# Patient Record
Sex: MALE | Race: OTHER | Employment: OTHER | URBAN - METROPOLITAN AREA
[De-identification: names, ages, dates, MRNs, and addresses within clinical notes are randomized per-mention and may not be internally consistent; named-entity substitution may affect disease eponyms.]

---

## 2017-05-06 ENCOUNTER — HOSPITAL ENCOUNTER (OUTPATIENT)
Age: 77
Discharge: HOME | End: 2017-05-06
Payer: MEDICARE

## 2017-05-06 DIAGNOSIS — N18.1 CKD (CHRONIC KIDNEY DISEASE) STAGE 1, GFR 90 ML/MIN OR GREATER: ICD-10-CM

## 2017-05-06 DIAGNOSIS — R80.9 MICROALBUMINURIA: ICD-10-CM

## 2017-05-06 DIAGNOSIS — N18.1 BENIGN HYPERTENSION WITH CKD (CHRONIC KIDNEY DISEASE) STAGE I: ICD-10-CM

## 2017-05-06 DIAGNOSIS — I12.9 BENIGN HYPERTENSION WITH CKD (CHRONIC KIDNEY DISEASE) STAGE I: ICD-10-CM

## 2017-05-06 LAB
ABSOLUTE EOS #: 0.2 K/UL (ref 0–0.4)
ABSOLUTE LYMPH #: 1.9 K/UL (ref 1–4.8)
ABSOLUTE MONO #: 0.5 K/UL (ref 0.1–1.3)
ANION GAP SERPL CALCULATED.3IONS-SCNC: 12 MMOL/L (ref 9–17)
BASOPHILS # BLD: 1 %
BASOPHILS ABSOLUTE: 0.1 K/UL (ref 0–0.2)
BUN BLDV-MCNC: 14 MG/DL (ref 8–23)
BUN/CREAT BLD: ABNORMAL (ref 9–20)
CALCIUM SERPL-MCNC: 9.6 MG/DL (ref 8.6–10.4)
CHLORIDE BLD-SCNC: 97 MMOL/L (ref 98–107)
CO2: 30 MMOL/L (ref 20–31)
CREAT SERPL-MCNC: 0.74 MG/DL (ref 0.7–1.2)
CREATININE URINE: 187.4 MG/DL (ref 39–259)
DIFFERENTIAL TYPE: NORMAL
EOSINOPHILS RELATIVE PERCENT: 4 %
GFR AFRICAN AMERICAN: >60 ML/MIN
GFR NON-AFRICAN AMERICAN: >60 ML/MIN
GFR SERPL CREATININE-BSD FRML MDRD: ABNORMAL ML/MIN/{1.73_M2}
GFR SERPL CREATININE-BSD FRML MDRD: ABNORMAL ML/MIN/{1.73_M2}
GLUCOSE BLD-MCNC: 126 MG/DL (ref 70–99)
HCT VFR BLD CALC: 47.5 % (ref 41–53)
HEMOGLOBIN: 16 G/DL (ref 13.5–17.5)
LYMPHOCYTES # BLD: 33 %
MAGNESIUM: 1.6 MG/DL (ref 1.6–2.6)
MCH RBC QN AUTO: 28.9 PG (ref 26–34)
MCHC RBC AUTO-ENTMCNC: 33.6 G/DL (ref 31–37)
MCV RBC AUTO: 86 FL (ref 80–100)
MICROALBUMIN/CREAT 24H UR: 27 MG/L
MICROALBUMIN/CREAT UR-RTO: 14 MCG/MG CREAT
MONOCYTES # BLD: 8 %
PDW BLD-RTO: 14 % (ref 11.5–14.9)
PHOSPHORUS: 3.2 MG/DL (ref 2.5–4.5)
PLATELET # BLD: 181 K/UL (ref 150–450)
PLATELET ESTIMATE: NORMAL
PMV BLD AUTO: 8.5 FL (ref 6–12)
POTASSIUM SERPL-SCNC: 4 MMOL/L (ref 3.7–5.3)
RBC # BLD: 5.53 M/UL (ref 4.5–5.9)
RBC # BLD: NORMAL 10*6/UL
SEG NEUTROPHILS: 54 %
SEGMENTED NEUTROPHILS ABSOLUTE COUNT: 3 K/UL (ref 1.3–9.1)
SODIUM BLD-SCNC: 139 MMOL/L (ref 135–144)
WBC # BLD: 5.6 K/UL (ref 3.5–11)
WBC # BLD: NORMAL 10*3/UL

## 2017-05-06 PROCEDURE — 80048 BASIC METABOLIC PNL TOTAL CA: CPT

## 2017-05-06 PROCEDURE — 83735 ASSAY OF MAGNESIUM: CPT

## 2017-05-06 PROCEDURE — 82570 ASSAY OF URINE CREATININE: CPT

## 2017-05-06 PROCEDURE — 85025 COMPLETE CBC W/AUTO DIFF WBC: CPT

## 2017-05-06 PROCEDURE — 82043 UR ALBUMIN QUANTITATIVE: CPT

## 2017-05-06 PROCEDURE — 84100 ASSAY OF PHOSPHORUS: CPT

## 2017-05-06 PROCEDURE — 36415 COLL VENOUS BLD VENIPUNCTURE: CPT

## 2017-11-06 ENCOUNTER — HOSPITAL ENCOUNTER (OUTPATIENT)
Age: 77
Discharge: HOME | End: 2017-11-06
Payer: MEDICARE

## 2017-11-06 LAB
ALBUMIN SERPL-MCNC: 4.4 G/DL (ref 3.5–5.2)
ALBUMIN/GLOBULIN RATIO: ABNORMAL (ref 1–2.5)
ALP BLD-CCNC: 48 U/L (ref 40–129)
ALT SERPL-CCNC: 47 U/L (ref 5–41)
AST SERPL-CCNC: 19 U/L
BILIRUB SERPL-MCNC: 0.61 MG/DL (ref 0.3–1.2)
BILIRUBIN DIRECT: 0.17 MG/DL
BILIRUBIN, INDIRECT: 0.44 MG/DL (ref 0–1)
FERRITIN: 114 UG/L (ref 30–400)
GLOBULIN: ABNORMAL G/DL (ref 1.5–3.8)
IRON SATURATION: 36 % (ref 20–55)
IRON: 113 UG/DL (ref 59–158)
TOTAL IRON BINDING CAPACITY: 312 UG/DL (ref 250–450)
TOTAL PROTEIN: 7.8 G/DL (ref 6.4–8.3)
UNSATURATED IRON BINDING CAPACITY: 199 UG/DL (ref 112–347)

## 2017-11-06 PROCEDURE — 83540 ASSAY OF IRON: CPT

## 2017-11-06 PROCEDURE — 83550 IRON BINDING TEST: CPT

## 2017-11-06 PROCEDURE — 82728 ASSAY OF FERRITIN: CPT

## 2017-11-06 PROCEDURE — 36415 COLL VENOUS BLD VENIPUNCTURE: CPT

## 2017-11-06 PROCEDURE — 80076 HEPATIC FUNCTION PANEL: CPT

## 2018-05-01 PROBLEM — N18.2 CKD (CHRONIC KIDNEY DISEASE), STAGE II: Status: ACTIVE | Noted: 2018-05-01

## 2019-04-25 ENCOUNTER — HOSPITAL ENCOUNTER (OUTPATIENT)
Age: 79
Discharge: HOME | End: 2019-04-25
Payer: MEDICARE

## 2019-04-25 DIAGNOSIS — I12.9 BENIGN HYPERTENSION WITH CKD (CHRONIC KIDNEY DISEASE), STAGE II: ICD-10-CM

## 2019-04-25 DIAGNOSIS — N18.2 CKD (CHRONIC KIDNEY DISEASE), STAGE II: ICD-10-CM

## 2019-04-25 DIAGNOSIS — E13.22 SECONDARY DIABETES MELLITUS WITH STAGE 2 CHRONIC KIDNEY DISEASE (GFR 60-89) (HCC): ICD-10-CM

## 2019-04-25 DIAGNOSIS — N18.2 BENIGN HYPERTENSION WITH CKD (CHRONIC KIDNEY DISEASE), STAGE II: ICD-10-CM

## 2019-04-25 DIAGNOSIS — N18.2 SECONDARY DIABETES MELLITUS WITH STAGE 2 CHRONIC KIDNEY DISEASE (GFR 60-89) (HCC): ICD-10-CM

## 2019-04-25 LAB
ABSOLUTE EOS #: 0.2 K/UL (ref 0–0.4)
ABSOLUTE IMMATURE GRANULOCYTE: ABNORMAL K/UL (ref 0–0.3)
ABSOLUTE LYMPH #: 1.2 K/UL (ref 1–4.8)
ABSOLUTE MONO #: 0.4 K/UL (ref 0.1–1.3)
ANION GAP SERPL CALCULATED.3IONS-SCNC: 14 MMOL/L (ref 9–17)
BASOPHILS # BLD: 1 % (ref 0–2)
BASOPHILS ABSOLUTE: 0 K/UL (ref 0–0.2)
BUN BLDV-MCNC: 12 MG/DL (ref 8–23)
BUN/CREAT BLD: ABNORMAL (ref 9–20)
CALCIUM SERPL-MCNC: 9.3 MG/DL (ref 8.6–10.4)
CHLORIDE BLD-SCNC: 98 MMOL/L (ref 98–107)
CO2: 26 MMOL/L (ref 20–31)
CREAT SERPL-MCNC: 0.81 MG/DL (ref 0.7–1.2)
DIFFERENTIAL TYPE: ABNORMAL
EOSINOPHILS RELATIVE PERCENT: 5 % (ref 0–4)
GFR AFRICAN AMERICAN: >60 ML/MIN
GFR NON-AFRICAN AMERICAN: >60 ML/MIN
GFR SERPL CREATININE-BSD FRML MDRD: ABNORMAL ML/MIN/{1.73_M2}
GFR SERPL CREATININE-BSD FRML MDRD: ABNORMAL ML/MIN/{1.73_M2}
GLUCOSE BLD-MCNC: 277 MG/DL (ref 70–99)
HCT VFR BLD CALC: 44.1 % (ref 41–53)
HEMOGLOBIN: 14.9 G/DL (ref 13.5–17.5)
IMMATURE GRANULOCYTES: ABNORMAL %
LYMPHOCYTES # BLD: 26 % (ref 24–44)
MAGNESIUM: 1.7 MG/DL (ref 1.6–2.6)
MCH RBC QN AUTO: 29.3 PG (ref 26–34)
MCHC RBC AUTO-ENTMCNC: 33.8 G/DL (ref 31–37)
MCV RBC AUTO: 86.8 FL (ref 80–100)
MONOCYTES # BLD: 9 % (ref 1–7)
NRBC AUTOMATED: ABNORMAL PER 100 WBC
PDW BLD-RTO: 14.1 % (ref 11.5–14.9)
PHOSPHORUS: 3.1 MG/DL (ref 2.5–4.5)
PLATELET # BLD: 216 K/UL (ref 150–450)
PLATELET ESTIMATE: ABNORMAL
PMV BLD AUTO: 8 FL (ref 6–12)
POTASSIUM SERPL-SCNC: 3.9 MMOL/L (ref 3.7–5.3)
RBC # BLD: 5.08 M/UL (ref 4.5–5.9)
RBC # BLD: ABNORMAL 10*6/UL
SEG NEUTROPHILS: 59 % (ref 36–66)
SEGMENTED NEUTROPHILS ABSOLUTE COUNT: 2.9 K/UL (ref 1.3–9.1)
SODIUM BLD-SCNC: 138 MMOL/L (ref 135–144)
WBC # BLD: 4.9 K/UL (ref 3.5–11)
WBC # BLD: ABNORMAL 10*3/UL

## 2019-04-25 PROCEDURE — 84100 ASSAY OF PHOSPHORUS: CPT

## 2019-04-25 PROCEDURE — 83735 ASSAY OF MAGNESIUM: CPT

## 2019-04-25 PROCEDURE — 80048 BASIC METABOLIC PNL TOTAL CA: CPT

## 2019-04-25 PROCEDURE — 36415 COLL VENOUS BLD VENIPUNCTURE: CPT

## 2019-04-25 PROCEDURE — 85025 COMPLETE CBC W/AUTO DIFF WBC: CPT

## 2023-01-19 ENCOUNTER — APPOINTMENT (OUTPATIENT)
Dept: CT IMAGING | Age: 83
End: 2023-01-19
Payer: OTHER GOVERNMENT

## 2023-01-19 ENCOUNTER — APPOINTMENT (OUTPATIENT)
Dept: GENERAL RADIOLOGY | Age: 83
End: 2023-01-19
Payer: OTHER GOVERNMENT

## 2023-01-19 ENCOUNTER — HOSPITAL ENCOUNTER (OUTPATIENT)
Age: 83
Setting detail: OBSERVATION
Discharge: HOME | End: 2023-01-21
Attending: EMERGENCY MEDICINE | Admitting: INTERNAL MEDICINE
Payer: OTHER GOVERNMENT

## 2023-01-19 DIAGNOSIS — R42 VERTIGO: Primary | ICD-10-CM

## 2023-01-19 LAB
ABSOLUTE EOS #: 0.3 K/UL (ref 0–0.4)
ABSOLUTE LYMPH #: 1.4 K/UL (ref 1–4.8)
ABSOLUTE MONO #: 0.5 K/UL (ref 0.1–1.3)
ALBUMIN SERPL-MCNC: 4.1 G/DL (ref 3.5–5.2)
ALP BLD-CCNC: 48 U/L (ref 40–129)
ALT SERPL-CCNC: 28 U/L (ref 5–41)
ANION GAP SERPL CALCULATED.3IONS-SCNC: 10 MMOL/L (ref 9–17)
AST SERPL-CCNC: 12 U/L
BASOPHILS # BLD: 1 % (ref 0–2)
BASOPHILS ABSOLUTE: 0.1 K/UL (ref 0–0.2)
BILIRUB SERPL-MCNC: 0.4 MG/DL (ref 0.3–1.2)
BILIRUBIN URINE: NEGATIVE
BUN BLDV-MCNC: 13 MG/DL (ref 8–23)
CALCIUM SERPL-MCNC: 9.3 MG/DL (ref 8.6–10.4)
CHLORIDE BLD-SCNC: 97 MMOL/L (ref 98–107)
CO2: 30 MMOL/L (ref 20–31)
COLOR: YELLOW
COMMENT UA: ABNORMAL
CREAT SERPL-MCNC: 0.99 MG/DL (ref 0.7–1.2)
EOSINOPHILS RELATIVE PERCENT: 5 % (ref 0–4)
GFR SERPL CREATININE-BSD FRML MDRD: >60 ML/MIN/1.73M2
GLUCOSE BLD-MCNC: 246 MG/DL (ref 70–99)
GLUCOSE URINE: NEGATIVE
HCT VFR BLD CALC: 43.4 % (ref 41–53)
HEMOGLOBIN: 14.2 G/DL (ref 13.5–17.5)
KETONES, URINE: NEGATIVE
LEUKOCYTE ESTERASE, URINE: NEGATIVE
LYMPHOCYTES # BLD: 22 % (ref 24–44)
MCH RBC QN AUTO: 28.5 PG (ref 26–34)
MCHC RBC AUTO-ENTMCNC: 32.7 G/DL (ref 31–37)
MCV RBC AUTO: 87.2 FL (ref 80–100)
MONOCYTES # BLD: 8 % (ref 1–7)
NITRITE, URINE: NEGATIVE
PDW BLD-RTO: 14.1 % (ref 11.5–14.9)
PH UA: 7.5 (ref 5–8)
PLATELET # BLD: 223 K/UL (ref 150–450)
PMV BLD AUTO: 8.3 FL (ref 6–12)
POTASSIUM SERPL-SCNC: 4.3 MMOL/L (ref 3.7–5.3)
PROTEIN UA: NEGATIVE
RBC # BLD: 4.97 M/UL (ref 4.5–5.9)
SEG NEUTROPHILS: 64 % (ref 36–66)
SEGMENTED NEUTROPHILS ABSOLUTE COUNT: 4 K/UL (ref 1.3–9.1)
SODIUM BLD-SCNC: 137 MMOL/L (ref 135–144)
SPECIFIC GRAVITY UA: 1.03 (ref 1–1.03)
TOTAL PROTEIN: 6.9 G/DL (ref 6.4–8.3)
TROPONIN, HIGH SENSITIVITY: 17 NG/L (ref 0–22)
TURBIDITY: CLEAR
URINE HGB: NEGATIVE
UROBILINOGEN, URINE: NORMAL
WBC # BLD: 6.2 K/UL (ref 3.5–11)

## 2023-01-19 PROCEDURE — 93005 ELECTROCARDIOGRAM TRACING: CPT | Performed by: EMERGENCY MEDICINE

## 2023-01-19 PROCEDURE — 99285 EMERGENCY DEPT VISIT HI MDM: CPT

## 2023-01-19 PROCEDURE — 81003 URINALYSIS AUTO W/O SCOPE: CPT

## 2023-01-19 PROCEDURE — 6360000002 HC RX W HCPCS: Performed by: EMERGENCY MEDICINE

## 2023-01-19 PROCEDURE — 85025 COMPLETE CBC W/AUTO DIFF WBC: CPT

## 2023-01-19 PROCEDURE — 70450 CT HEAD/BRAIN W/O DYE: CPT

## 2023-01-19 PROCEDURE — 36415 COLL VENOUS BLD VENIPUNCTURE: CPT

## 2023-01-19 PROCEDURE — 71045 X-RAY EXAM CHEST 1 VIEW: CPT

## 2023-01-19 PROCEDURE — G0378 HOSPITAL OBSERVATION PER HR: HCPCS

## 2023-01-19 PROCEDURE — 70498 CT ANGIOGRAPHY NECK: CPT

## 2023-01-19 PROCEDURE — 6370000000 HC RX 637 (ALT 250 FOR IP): Performed by: EMERGENCY MEDICINE

## 2023-01-19 PROCEDURE — 2580000003 HC RX 258: Performed by: EMERGENCY MEDICINE

## 2023-01-19 PROCEDURE — 84484 ASSAY OF TROPONIN QUANT: CPT

## 2023-01-19 PROCEDURE — 96374 THER/PROPH/DIAG INJ IV PUSH: CPT

## 2023-01-19 PROCEDURE — 80053 COMPREHEN METABOLIC PANEL: CPT

## 2023-01-19 PROCEDURE — 6360000004 HC RX CONTRAST MEDICATION: Performed by: EMERGENCY MEDICINE

## 2023-01-19 RX ORDER — SODIUM CHLORIDE 0.9 % (FLUSH) 0.9 %
10 SYRINGE (ML) INJECTION PRN
Status: DISCONTINUED | OUTPATIENT
Start: 2023-01-19 | End: 2023-01-21 | Stop reason: HOSPADM

## 2023-01-19 RX ORDER — LISINOPRIL 2.5 MG/1
2.5 TABLET ORAL DAILY
COMMUNITY

## 2023-01-19 RX ORDER — 0.9 % SODIUM CHLORIDE 0.9 %
100 INTRAVENOUS SOLUTION INTRAVENOUS ONCE
Status: COMPLETED | OUTPATIENT
Start: 2023-01-19 | End: 2023-01-19

## 2023-01-19 RX ORDER — PROCHLORPERAZINE EDISYLATE 5 MG/ML
10 INJECTION INTRAMUSCULAR; INTRAVENOUS ONCE
Status: COMPLETED | OUTPATIENT
Start: 2023-01-19 | End: 2023-01-19

## 2023-01-19 RX ORDER — TAMSULOSIN HYDROCHLORIDE 0.4 MG/1
0.4 CAPSULE ORAL DAILY
COMMUNITY

## 2023-01-19 RX ORDER — ASPIRIN 81 MG/1
81 TABLET, CHEWABLE ORAL ONCE
Status: COMPLETED | OUTPATIENT
Start: 2023-01-19 | End: 2023-01-19

## 2023-01-19 RX ADMIN — SODIUM CHLORIDE 100 ML: 9 INJECTION, SOLUTION INTRAVENOUS at 21:38

## 2023-01-19 RX ADMIN — IOPAMIDOL 75 ML: 755 INJECTION, SOLUTION INTRAVENOUS at 21:38

## 2023-01-19 RX ADMIN — ASPIRIN 81 MG: 81 TABLET, CHEWABLE ORAL at 22:24

## 2023-01-19 RX ADMIN — PROCHLORPERAZINE EDISYLATE 10 MG: 5 INJECTION INTRAMUSCULAR; INTRAVENOUS at 19:45

## 2023-01-19 RX ADMIN — SODIUM CHLORIDE, PRESERVATIVE FREE 10 ML: 5 INJECTION INTRAVENOUS at 21:38

## 2023-01-19 ASSESSMENT — ENCOUNTER SYMPTOMS
EYE PAIN: 0
SHORTNESS OF BREATH: 0
CONSTIPATION: 0
DIARRHEA: 0
NAUSEA: 0
VOMITING: 0
ABDOMINAL PAIN: 0
COUGH: 0
BACK PAIN: 0
SORE THROAT: 0
CHEST TIGHTNESS: 0

## 2023-01-19 ASSESSMENT — LIFESTYLE VARIABLES
HOW OFTEN DO YOU HAVE A DRINK CONTAINING ALCOHOL: NEVER
HOW MANY STANDARD DRINKS CONTAINING ALCOHOL DO YOU HAVE ON A TYPICAL DAY: PATIENT DOES NOT DRINK

## 2023-01-19 ASSESSMENT — PAIN - FUNCTIONAL ASSESSMENT: PAIN_FUNCTIONAL_ASSESSMENT: NONE - DENIES PAIN

## 2023-01-19 NOTE — ED NOTES
Mode of arrival (squad #, walk in, police, etc) : Walk in        Chief complaint(s): Dizziness / Near syncope        Arrival Note (brief scenario, treatment PTA, etc). : Pt experiencing increased dizziness x 1month but started having near syncopal episodes today. Pt is A&Ox4 but slightly hypotensive for his baseline        C= \"Have you ever felt that you should Cut down on your drinking? \"  No  A= \"Have people Annoyed you by criticizing your drinking? \"  No  G= \"Have you ever felt bad or Guilty about your drinking? \"  No  E= \"Have you ever had a drink as an Eye-opener first thing in the morning to steady your nerves or to help a hangover? \"  No      Deferred []      Reason for deferring: N/A    *If yes to two or more: probable alcohol abuse. Anne Pitt RN  01/19/23 2379

## 2023-01-20 ENCOUNTER — APPOINTMENT (OUTPATIENT)
Dept: MRI IMAGING | Age: 83
End: 2023-01-20
Payer: OTHER GOVERNMENT

## 2023-01-20 ENCOUNTER — APPOINTMENT (OUTPATIENT)
Dept: VASCULAR LAB | Age: 83
End: 2023-01-20
Payer: OTHER GOVERNMENT

## 2023-01-20 PROBLEM — I65.29 CAROTID STENOSIS: Status: ACTIVE | Noted: 2023-01-20

## 2023-01-20 LAB
GLUCOSE BLD-MCNC: 101 MG/DL (ref 75–110)
GLUCOSE BLD-MCNC: 185 MG/DL (ref 75–110)
GLUCOSE BLD-MCNC: 228 MG/DL (ref 75–110)
GLUCOSE BLD-MCNC: 279 MG/DL (ref 75–110)

## 2023-01-20 PROCEDURE — G0378 HOSPITAL OBSERVATION PER HR: HCPCS

## 2023-01-20 PROCEDURE — 6370000000 HC RX 637 (ALT 250 FOR IP): Performed by: NURSE PRACTITIONER

## 2023-01-20 PROCEDURE — 82947 ASSAY GLUCOSE BLOOD QUANT: CPT

## 2023-01-20 PROCEDURE — 2580000003 HC RX 258: Performed by: NURSE PRACTITIONER

## 2023-01-20 PROCEDURE — 70551 MRI BRAIN STEM W/O DYE: CPT

## 2023-01-20 PROCEDURE — 99222 1ST HOSP IP/OBS MODERATE 55: CPT | Performed by: SURGERY

## 2023-01-20 PROCEDURE — 6360000002 HC RX W HCPCS: Performed by: NURSE PRACTITIONER

## 2023-01-20 PROCEDURE — 93880 EXTRACRANIAL BILAT STUDY: CPT

## 2023-01-20 PROCEDURE — 99223 1ST HOSP IP/OBS HIGH 75: CPT | Performed by: INTERNAL MEDICINE

## 2023-01-20 PROCEDURE — 96372 THER/PROPH/DIAG INJ SC/IM: CPT

## 2023-01-20 RX ORDER — SODIUM CHLORIDE 0.9 % (FLUSH) 0.9 %
10 SYRINGE (ML) INJECTION PRN
Status: DISCONTINUED | OUTPATIENT
Start: 2023-01-20 | End: 2023-01-21 | Stop reason: HOSPADM

## 2023-01-20 RX ORDER — MECLIZINE HYDROCHLORIDE 25 MG/1
12.5 TABLET ORAL 3 TIMES DAILY PRN
Status: DISCONTINUED | OUTPATIENT
Start: 2023-01-20 | End: 2023-01-21 | Stop reason: HOSPADM

## 2023-01-20 RX ORDER — ACETAMINOPHEN 650 MG/1
650 SUPPOSITORY RECTAL EVERY 6 HOURS PRN
Status: DISCONTINUED | OUTPATIENT
Start: 2023-01-20 | End: 2023-01-21 | Stop reason: HOSPADM

## 2023-01-20 RX ORDER — ONDANSETRON 4 MG/1
4 TABLET, ORALLY DISINTEGRATING ORAL EVERY 8 HOURS PRN
Status: DISCONTINUED | OUTPATIENT
Start: 2023-01-20 | End: 2023-01-21 | Stop reason: HOSPADM

## 2023-01-20 RX ORDER — ISOSORBIDE MONONITRATE 60 MG/1
60 TABLET, EXTENDED RELEASE ORAL DAILY
Status: DISCONTINUED | OUTPATIENT
Start: 2023-01-20 | End: 2023-01-21 | Stop reason: HOSPADM

## 2023-01-20 RX ORDER — FINASTERIDE 5 MG/1
5 TABLET, FILM COATED ORAL DAILY
Status: DISCONTINUED | OUTPATIENT
Start: 2023-01-20 | End: 2023-01-21 | Stop reason: HOSPADM

## 2023-01-20 RX ORDER — PANTOPRAZOLE SODIUM 20 MG/1
20 TABLET, DELAYED RELEASE ORAL
Status: DISCONTINUED | OUTPATIENT
Start: 2023-01-20 | End: 2023-01-21 | Stop reason: HOSPADM

## 2023-01-20 RX ORDER — LISINOPRIL 2.5 MG/1
2.5 TABLET ORAL DAILY
Status: DISCONTINUED | OUTPATIENT
Start: 2023-01-20 | End: 2023-01-21 | Stop reason: HOSPADM

## 2023-01-20 RX ORDER — SODIUM CHLORIDE 9 MG/ML
INJECTION, SOLUTION INTRAVENOUS PRN
Status: DISCONTINUED | OUTPATIENT
Start: 2023-01-20 | End: 2023-01-21 | Stop reason: HOSPADM

## 2023-01-20 RX ORDER — TAMSULOSIN HYDROCHLORIDE 0.4 MG/1
0.4 CAPSULE ORAL DAILY
Status: DISCONTINUED | OUTPATIENT
Start: 2023-01-20 | End: 2023-01-21 | Stop reason: HOSPADM

## 2023-01-20 RX ORDER — ENOXAPARIN SODIUM 100 MG/ML
40 INJECTION SUBCUTANEOUS DAILY
Status: DISCONTINUED | OUTPATIENT
Start: 2023-01-20 | End: 2023-01-21 | Stop reason: HOSPADM

## 2023-01-20 RX ORDER — ONDANSETRON 2 MG/ML
4 INJECTION INTRAMUSCULAR; INTRAVENOUS EVERY 6 HOURS PRN
Status: DISCONTINUED | OUTPATIENT
Start: 2023-01-20 | End: 2023-01-21 | Stop reason: HOSPADM

## 2023-01-20 RX ORDER — DEXTROSE MONOHYDRATE 100 MG/ML
INJECTION, SOLUTION INTRAVENOUS CONTINUOUS PRN
Status: DISCONTINUED | OUTPATIENT
Start: 2023-01-20 | End: 2023-01-21 | Stop reason: HOSPADM

## 2023-01-20 RX ORDER — ATORVASTATIN CALCIUM 40 MG/1
40 TABLET, FILM COATED ORAL DAILY
Status: DISCONTINUED | OUTPATIENT
Start: 2023-01-20 | End: 2023-01-21 | Stop reason: HOSPADM

## 2023-01-20 RX ORDER — ASPIRIN 81 MG/1
81 TABLET ORAL DAILY
Status: DISCONTINUED | OUTPATIENT
Start: 2023-01-20 | End: 2023-01-21 | Stop reason: HOSPADM

## 2023-01-20 RX ORDER — LANOLIN ALCOHOL/MO/W.PET/CERES
3 CREAM (GRAM) TOPICAL NIGHTLY PRN
Status: DISCONTINUED | OUTPATIENT
Start: 2023-01-20 | End: 2023-01-21 | Stop reason: HOSPADM

## 2023-01-20 RX ORDER — INSULIN LISPRO 100 [IU]/ML
0-4 INJECTION, SOLUTION INTRAVENOUS; SUBCUTANEOUS NIGHTLY
Status: DISCONTINUED | OUTPATIENT
Start: 2023-01-20 | End: 2023-01-21 | Stop reason: HOSPADM

## 2023-01-20 RX ORDER — METOPROLOL SUCCINATE 25 MG/1
25 TABLET, EXTENDED RELEASE ORAL DAILY
Status: DISCONTINUED | OUTPATIENT
Start: 2023-01-20 | End: 2023-01-21 | Stop reason: HOSPADM

## 2023-01-20 RX ORDER — INSULIN LISPRO 100 [IU]/ML
0-8 INJECTION, SOLUTION INTRAVENOUS; SUBCUTANEOUS
Status: DISCONTINUED | OUTPATIENT
Start: 2023-01-20 | End: 2023-01-21 | Stop reason: HOSPADM

## 2023-01-20 RX ORDER — ACETAMINOPHEN 325 MG/1
650 TABLET ORAL EVERY 6 HOURS PRN
Status: DISCONTINUED | OUTPATIENT
Start: 2023-01-20 | End: 2023-01-21 | Stop reason: HOSPADM

## 2023-01-20 RX ORDER — SODIUM CHLORIDE 0.9 % (FLUSH) 0.9 %
5-40 SYRINGE (ML) INJECTION EVERY 12 HOURS SCHEDULED
Status: DISCONTINUED | OUTPATIENT
Start: 2023-01-20 | End: 2023-01-21 | Stop reason: HOSPADM

## 2023-01-20 RX ADMIN — METOPROLOL SUCCINATE 25 MG: 25 TABLET, EXTENDED RELEASE ORAL at 09:46

## 2023-01-20 RX ADMIN — ISOSORBIDE MONONITRATE 60 MG: 60 TABLET, EXTENDED RELEASE ORAL at 09:46

## 2023-01-20 RX ADMIN — TAMSULOSIN HYDROCHLORIDE 0.4 MG: 0.4 CAPSULE ORAL at 09:46

## 2023-01-20 RX ADMIN — ENOXAPARIN SODIUM 40 MG: 100 INJECTION SUBCUTANEOUS at 09:46

## 2023-01-20 RX ADMIN — LISINOPRIL 2.5 MG: 2.5 TABLET ORAL at 10:28

## 2023-01-20 RX ADMIN — PANTOPRAZOLE SODIUM 20 MG: 20 TABLET, DELAYED RELEASE ORAL at 09:46

## 2023-01-20 RX ADMIN — MECLIZINE HYDROCHLORIDE 12.5 MG: 25 TABLET ORAL at 16:36

## 2023-01-20 RX ADMIN — Medication 3 MG: at 21:16

## 2023-01-20 RX ADMIN — ATORVASTATIN CALCIUM 40 MG: 40 TABLET, FILM COATED ORAL at 09:46

## 2023-01-20 RX ADMIN — Medication 3 MG: at 01:04

## 2023-01-20 RX ADMIN — SODIUM CHLORIDE, PRESERVATIVE FREE 10 ML: 5 INJECTION INTRAVENOUS at 09:47

## 2023-01-20 RX ADMIN — INSULIN LISPRO 4 UNITS: 100 INJECTION, SOLUTION INTRAVENOUS; SUBCUTANEOUS at 16:37

## 2023-01-20 RX ADMIN — ASPIRIN 81 MG: 81 TABLET, COATED ORAL at 09:46

## 2023-01-20 RX ADMIN — FINASTERIDE 5 MG: 5 TABLET, FILM COATED ORAL at 09:46

## 2023-01-20 ASSESSMENT — ENCOUNTER SYMPTOMS
ABDOMINAL PAIN: 0
SHORTNESS OF BREATH: 0
ABDOMINAL DISTENTION: 0
TROUBLE SWALLOWING: 0
COLOR CHANGE: 0
CHEST TIGHTNESS: 0
VOMITING: 0
COUGH: 0
EYE PAIN: 0
VOICE CHANGE: 0

## 2023-01-20 NOTE — PROGRESS NOTES
Division of Vascular Surgery          Called by ER regarding patient for vertigo, dizziness symptoms ongoing for over a month, getting worse recently accompanied by bifrontal headaches. CTA upon my review reveals tortuosity of the proximal left common carotid artery near takeoff from arch, and not a dissection flap. There is also some tortuosity at origin of left vertebral artery which is dominant vertebral artery, right vertebral artery is small and diminutive. No significant stenosis noted in the extracranial or intracranial internal carotid arteries bilaterally or in the bulb or common carotid arteries. Per ER he has not had any recent trauma or manipulation in his neck, no issues of severely elevated BP. No focal motor or sensory deficits to suggest cerebral ischemic event. At this time recommend further imaging with MRI of brain and carotid duplex to evaluate flow dynamics of carotid and vertebral arteries. Admit to medicine for now. No acute surgical interventions at this time and do not recommend stenting or angioplasty of either left common carotid or vertebral artery at this time. He should be started on antiplatelet therapy with aspirin 81 mg daily. Full consult to follow tomorrow once carotid duplex and MRI are done for definitive plans. Discussed with ER physician Dr. Mary Fernandez. Electronically signed by Cheikh Patino MD on 1/19/23 at 10:13 PM [address]: [phone]  C: [phone]  Email: [email or web address]

## 2023-01-20 NOTE — CONSULTS
Consult Note            Date:1/20/2023        Patient Name:Erasmo Villarreal     YOB: 1940     Age:82 y.o. Inpatient consult to Vascular Surgery  Consult performed by: Maxx Ya MD  Consult ordered by: Lyndsey Villegas MD  Reason for consult: Question of cerebrovascular disease with dissection  Assessment/Recommendations: The patient has no significant extracranial vascular disease. Most of the issues that I see are redundant vessels with some tortuosity and minimal kinking that we will not produce any of the symptoms that he has had. He has had no stroke on MRI and therefore he can follow with us on a as needed basis. It should be noted that the carotid bifurcations have minimal plaque with no significant stenoses. Chief Complaint     Chief Complaint   Patient presents with    Dizziness     Near syncopal    Headache      Dizziness    History Obtained From   patient    History of Present Illness   The patient is a healthy 75-year-old male who came to the hospital with dizziness and some double vision. He had no lateralizing neurologic symptoms. He did get CTA of the carotids and vertebral arteries showing some tortuosity with minimal kinking but no significant stenoses. He has no carotid stenosis on either side on CTA with minimal plaque. The right vertebral is diminutive in the left vertebral is dominant. The left vertebral has tortuosity at its origin as well with no significant stenosis. His MRI has shown no evidence of acute stroke. He does have high blood pressure high cholesterol and diabetes. He has never had coronary problems.     Past Medical History     Past Medical History:   Diagnosis Date    Ascending aortic aneurysm 4/17/2013    3.6 cm, PT DENIES    BPH (benign prostatic hyperplasia) 2010    no tx @ this time    Colon cancer ([facility] [address] 11/13    rectal cancer, COLOSTOMY HAD CHEMO    Diabetes mellitus ([facility] [address] 2000    GERD (gastroesophageal reflux disease)     High cholesterol 2003    Hypertension 1990    Psoriasis     Septicemia ([facility] [address]     AFTER COLON SURGERY    Umbilical hernia         Past Surgical History     Past Surgical History:   Procedure Laterality Date    CARDIAC CATHETERIZATION  2006    mild disease    CATARACT REMOVAL Bilateral 2005    COLECTOMY  1/27/2016    Laparotomy/partial colectomy with relocation of colostomy/repair of peristomal hernia with mesh    COLONOSCOPY  10-28 14    lipoma, no biopsies, per colostomy    LEFT COLECTOMY  11/13    with colostomy D/T CA    OTHER SURGICAL HISTORY Left 10-18-14    Removal of  Lt. chemo-port    TUNNELED VENOUS PORT PLACEMENT          Medications     Prior to Admission medications    Medication Sig Start Date End Date Taking? Authorizing Provider   tamsulosin (FLOMAX) 0.4 MG capsule Take 0.4 mg by mouth daily   Yes Historical Provider, MD   lisinopril (PRINIVIL;ZESTRIL) 2.5 MG tablet Take 2.5 mg by mouth daily   Yes Historical Provider, MD   lansoprazole (PREVACID) 30 MG capsule Take 30 mg by mouth daily   Yes Historical Provider, MD   ferrous sulfate 325 (65 FE) MG tablet Take 325 mg by mouth daily (with breakfast)   Yes Historical Provider, MD   Ascorbic Acid (VITAMIN C) 500 MG tablet Take 500 mg by mouth daily   Yes Historical Provider, MD   vitamin B-1 (THIAMINE) 100 MG tablet Take 100 mg by mouth daily B complex   Yes Historical Provider, MD   finasteride (PROSCAR) 5 MG tablet Take 1 tablet by mouth daily. 11/15/13  Yes Historical Provider, MD   glimepiride (AMARYL) 2 MG tablet Take 4 mg by mouth every morning (before breakfast). Yes Historical Provider, MD   Multiple Vitamins-Minerals (CENTRUM SILVER) TABS Take 1 tablet by mouth daily. Yes Historical Provider, MD   metformin (GLUCOPHAGE) 1000 MG tablet Take 1,000 mg by mouth 2 times daily (with meals). Yes Historical Provider, MD   isosorbide mononitrate (IMDUR) 60 MG CR tablet Take 60 mg by mouth daily.    Yes Historical Provider, MD   metoprolol (TOPROL-XL) 50 MG XL tablet Take 25 mg by mouth daily. Yes Historical Provider, MD   simvastatin (ZOCOR) 80 MG tablet Take 80 mg by mouth nightly. Yes Historical Provider, MD        sodium chloride flush 0.9 % injection 5-40 mL, 2 times per day  sodium chloride flush 0.9 % injection 10 mL, PRN  0.9 % sodium chloride infusion, PRN  enoxaparin (LOVENOX) injection 40 mg, Daily  ondansetron (ZOFRAN-ODT) disintegrating tablet 4 mg, Q8H PRN   Or  ondansetron (ZOFRAN) injection 4 mg, Q6H PRN  magnesium hydroxide (MILK OF MAGNESIA) 400 MG/5ML suspension 30 mL, Daily PRN  acetaminophen (TYLENOL) tablet 650 mg, Q6H PRN   Or  acetaminophen (TYLENOL) suppository 650 mg, Q6H PRN  aspirin EC tablet 81 mg, Daily  finasteride (PROSCAR) tablet 5 mg, Daily  isosorbide mononitrate (IMDUR) extended release tablet 60 mg, Daily  pantoprazole (PROTONIX) tablet 20 mg, QAM AC  lisinopril (PRINIVIL;ZESTRIL) tablet 2.5 mg, Daily  metoprolol succinate (TOPROL XL) extended release tablet 25 mg, Daily  atorvastatin (LIPITOR) tablet 40 mg, Daily  tamsulosin (FLOMAX) capsule 0.4 mg, Daily  glucose chewable tablet 16 g, PRN  dextrose bolus 10% 125 mL, PRN   Or  dextrose bolus 10% 250 mL, PRN  glucagon (rDNA) injection 1 mg, PRN  dextrose 10 % infusion, Continuous PRN  insulin lispro (HUMALOG) injection vial 0-8 Units, TID WC  insulin lispro (HUMALOG) injection vial 0-4 Units, Nightly  melatonin tablet 3 mg, Nightly PRN  meclizine (ANTIVERT) tablet 12.5 mg, TID PRN  sodium chloride flush 0.9 % injection 10 mL, PRN        Allergies   Patient has no known allergies.     Social History     Social History       Tobacco History       Smoking Status  Never      Smokeless Tobacco Use  Never              Alcohol History       Alcohol Use Status  No Comment  HX of drinking              Drug Use       Drug Use Status  No              Sexual Activity       Sexually Active  Not Asked                    Family History     Family History   Problem Relation Age of Onset Diabetes Mother     Rheum Arthritis Mother     Stroke Father     Cancer Father         colon    Cancer Brother         throat    Cancer Sister         skin    Heart Failure Brother        Review of Systems   Review of Systems   Constitutional:  Negative for activity change, appetite change, fever and unexpected weight change. HENT:  Negative for congestion, trouble swallowing and voice change. Eyes:  Negative for pain and visual disturbance. Respiratory:  Negative for cough, chest tightness and shortness of breath. Cardiovascular:  Negative for chest pain and palpitations. Gastrointestinal:  Negative for abdominal distention, abdominal pain and vomiting. Endocrine: Negative for cold intolerance and heat intolerance. Genitourinary:  Negative for dysuria, flank pain and hematuria. Musculoskeletal:  Negative for joint swelling and neck pain. Skin:  Negative for color change and rash. Allergic/Immunologic: Negative for immunocompromised state. Neurological:  Negative for syncope, speech difficulty, weakness, numbness and headaches. Hematological:  Negative for adenopathy. Psychiatric/Behavioral:  Negative for agitation and confusion. Physical Exam   /78   Pulse 81   Temp 98 °F (36.7 °C) (Oral)   Resp 17   Ht 5' 6\" (1.676 m)   Wt 173 lb (78.5 kg)   SpO2 95%   BMI 27.92 kg/m²      Physical Exam  Constitutional:       General: He is not in acute distress. Appearance: He is not ill-appearing. HENT:      Mouth/Throat:      Mouth: Mucous membranes are moist.      Pharynx: Oropharynx is clear. Eyes:      General: No scleral icterus. Extraocular Movements: Extraocular movements intact. Conjunctiva/sclera: Conjunctivae normal.   Neck:      Thyroid: No thyroid mass or thyromegaly. Cardiovascular:      Comments: There are no carotid bruits. Carotid pulses are normal.  The chest is clear to auscultation bilaterally.   The heart has a regular rate and rhythm with no murmurs rubs or gallops. The abdomen is soft without tenderness. There are no masses. There is no evidence of aneurysmal disease. The femoral pulses are palpable and equal bilaterally. The popliteal pulses as well as dorsalis pedis and posterior tibial pulses are all palpable and equal bilaterally. The feet are warm and well perfused without ulceration or gangrene. There is no significant edema. There are no varicosities. Pulmonary:      Effort: No respiratory distress. Breath sounds: No rales. Abdominal:      General: There is no distension. Palpations: There is no mass. Tenderness: There is no abdominal tenderness. There is no guarding or rebound. Musculoskeletal:      Cervical back: No rigidity or tenderness. Lymphadenopathy:      Cervical: No cervical adenopathy. Skin:     Coloration: Skin is not jaundiced. Findings: No rash. Neurological:      General: No focal deficit present. Mental Status: He is alert and oriented to person, place, and time. Cranial Nerves: No cranial nerve deficit. Psychiatric:         Mood and Affect: Mood normal.       Labs    CBC:  Recent Labs     01/19/23 1949   WBC 6.2   RBC 4.97   HGB 14.2   HCT 43.4   MCV 87.2   RDW 14.1        CHEMISTRIES:  Recent Labs     01/19/23 1949      K 4.3   CL 97*   CO2 30   BUN 13   CREATININE 0.99   GLUCOSE 246*     PT/INR:No results for input(s): PROTIME, INR in the last 72 hours. APTT:No results for input(s): APTT in the last 72 hours. LIVER PROFILE:  Recent Labs     01/19/23 1949   AST 12   ALT 28   BILITOT 0.4   ALKPHOS 48       Imaging/Diagnostics   CT HEAD WO CONTRAST    Result Date: 1/19/2023  No evidence of intracranial hemorrhage or any other definable acute intracranial abnormality. There is no focal abnormality or acute process in brain. Mild-to-moderate cerebral atrophy and moderate chronic microvascular ischemic/aging changes in the white matter of both cerebral hemispheres. XR CHEST PORTABLE    Result Date: 1/19/2023  Retrocardiac opacity which could reflect atelectasis or pneumonia. Follow-up PA and lateral chest radiographs may be helpful. Borderline cardiomegaly. CTA HEAD NECK W CONTRAST    Result Date: 1/19/2023  Stenosis or dissection or compromise in the common carotid and internal carotid arteries in the neck. Mild stenosis at the origin of the left vertebral artery. Rest of left vertebral artery is normally patent. Right vertebral artery is of very small caliber. No evidence of compromise in the intracranial anterior circulation of bilateral internal carotid arteries. No evidence of compromise in the vertebrobasilar arterial circulation. No definable focal acute process in brain. No definable dural sinus thrombosis. MRI BRAIN WO CONTRAST    Result Date: 1/20/2023  1. No acute infarct or acute intracranial process identified. 2. Mild chronic small vessel ischemic changes. Assessment      Hospital Problems             Last Modified POA    * (Principal) Vertigo 1/19/2023 Yes    Carotid stenosis 1/20/2023 Yes       Plan   1. At this time I see no evidence of significant problems in his cerebral vasculature. He has no significant stenoses. We can follow-up on a as needed basis.     Electronically signed by Rina Aschoff, MD on 1/20/23 at 6:39 PM EST

## 2023-01-20 NOTE — ED PROVIDER NOTES
[address] ED  eMERGENCY dEPARTMENT eNCOUnter    Pt Name: Brionna Duran  MRN: [id number]  [place] 1940  Date of evaluation: 1/19/23  CHIEF COMPLAINT       Chief Complaint   Patient presents with    Dizziness     Near syncopal    Headache     HISTORY OF PRESENT ILLNESS   HPI  One week of constant bifrontal headache. No head injury. He has had headaches occasionally in the past buth is is unusal for him. Associated with intermittent vertigo, has been happening rarely over the last month, more frequently over the last week. Yesterday he was so dizzy he had to crawl to the bathroom. REVIEW OF SYSTEMS     Review of Systems   Constitutional:  Negative for chills and fever. HENT:  Negative for congestion, ear pain and sore throat. Eyes:  Negative for pain and visual disturbance. Respiratory:  Negative for cough, chest tightness and shortness of breath. Cardiovascular:  Negative for chest pain and palpitations. Gastrointestinal:  Negative for abdominal pain, constipation, diarrhea, nausea and vomiting. Genitourinary:  Negative for dysuria and testicular pain. Musculoskeletal:  Negative for back pain. Skin:  Negative for rash and wound. Neurological:  Positive for dizziness and headaches. Negative for seizures and syncope.    PASTMEDICAL HISTORY     Past Medical History:   Diagnosis Date    Ascending aortic aneurysm ([address] 4/17/2013    3.6 cm, PT DENIES    BPH (benign prostatic hyperplasia) 2010    no tx @ this time    Colon cancer ([address] 11/13    rectal cancer, COLOSTOMY HAD CHEMO    Diabetes mellitus ([address] 2000    GERD (gastroesophageal reflux disease)     High cholesterol 2003    Hypertension 1990    Psoriasis     Septicemia ([address]     AFTER COLON SURGERY    Umbilical hernia      SURGICAL HISTORY       Past Surgical History:   Procedure Laterality Date    CARDIAC CATHETERIZATION  2006    mild disease    CATARACT REMOVAL Bilateral 2005    COLECTOMY  1/27/2016    Laparotomy/partial colectomy with relocation of colostomy/repair of peristomal hernia with mesh    COLONOSCOPY  10-28 14    lipoma, no biopsies, per colostomy    LEFT COLECTOMY      with colostomy D/T CA    OTHER SURGICAL HISTORY Left 10-18-14    Removal of  Lt. chemo-port    TUNNELED VENOUS PORT PLACEMENT       CURRENT MEDICATIONS       Previous Medications    ASCORBIC ACID (VITAMIN C) 500 MG TABLET    Take 500 mg by mouth daily    FERROUS SULFATE 325 (65 FE) MG TABLET    Take 325 mg by mouth daily (with breakfast)    FINASTERIDE (PROSCAR) 5 MG TABLET    Take 1 tablet by mouth daily. GLIMEPIRIDE (AMARYL) 2 MG TABLET    Take 4 mg by mouth every morning (before breakfast). ISOSORBIDE MONONITRATE (IMDUR) 60 MG CR TABLET    Take 60 mg by mouth daily. LANSOPRAZOLE (PREVACID) 30 MG CAPSULE    Take 30 mg by mouth daily    MAGNESIUM OXIDE (MAG-OX) 400 MG TABLET    TAKE 1 TABLET BY MOUTH TWICE DAILY    MAGNESIUM-OXIDE 400 (241.3 MG) MG TABS TABLET    TAKE 1 TABLET BY MOUTH TWICE DAILY    METFORMIN (GLUCOPHAGE) 1000 MG TABLET    Take 1,000 mg by mouth 2 times daily (with meals). METOPROLOL (TOPROL-XL) 50 MG XL TABLET    Take 25 mg by mouth daily. MULTIPLE VITAMINS-MINERALS (CENTRUM SILVER) TABS    Take 1 tablet by mouth daily. SIMVASTATIN (ZOCOR) 80 MG TABLET    Take 80 mg by mouth nightly. VITAMIN B-1 (THIAMINE) 100 MG TABLET    Take 100 mg by mouth daily B complex     ALLERGIES     has No Known Allergies. FAMILY HISTORY     He indicated that his mother is . He indicated that his father is . He indicated that both of his sisters are alive. He indicated that only one of his two brothers is alive. SOCIALHISTORY      reports that he has never smoked. He has never used smokeless tobacco. He reports that he does not drink alcohol and does not use drugs.   PHYSICAL EXAM     INITIAL VITALS: BP (!) 145/71   Pulse 55   Temp 97.9 °F (36.6 °C) (Oral)   Resp 14   Ht 5' 6\" (1.676 m)   Wt 173 lb (78.5 kg)   SpO2 93%   BMI 27.92 kg/m²    Physical Exam  Vitals and nursing note reviewed. Constitutional:       Appearance: He is well-developed. HENT:      Head: Normocephalic and atraumatic. Right Ear: External ear normal.      Left Ear: External ear normal.   Eyes:      Conjunctiva/sclera: Conjunctivae normal.      Pupils: Pupils are equal, round, and reactive to light. Neck:      Vascular: No JVD. Trachea: No tracheal deviation. Cardiovascular:      Rate and Rhythm: Normal rate and regular rhythm. Heart sounds: Normal heart sounds. Pulmonary:      Effort: Pulmonary effort is normal. No respiratory distress. Breath sounds: Normal breath sounds. No stridor. Abdominal:      General: Bowel sounds are normal. There is no distension. Palpations: Abdomen is soft. Tenderness: There is no abdominal tenderness. Musculoskeletal:         General: No tenderness or deformity. Normal range of motion. Cervical back: Normal range of motion and neck supple. Skin:     General: Skin is warm and dry. Neurological:      Mental Status: He is alert and oriented to person, place, and time. Cranial Nerves: No cranial nerve deficit. Coordination: Coordination normal.      Comments: Steady gait. Negative romberg sign. MEDICAL DECISION MAKING:   Patient presenting with headache and vertigo. CT/CTA concerning for \"stenosis or dissection or compromise in the common carotid and internal carotid arteries in the neck. \" Discussed with Dr Mor Horton on call for vascular surgery. He reviewed the images. Recommends starting aspirin, admit for carotid duplex and MRI brain in the morning. No longer established with eula, accepted for admission to [facility] clinic by Eliseo Robert np.          CRITICAL CARE:       PROCEDURES:    Procedures    DIAGNOSTIC RESULTS   EKG: All EKG's are interpreted by the Emergency Department Physician who either signs or Co-signs this chart inthe absence of a cardiologist.      RADIOLOGY:All plain film, CT, MRI, and formal ultrasound images (except ED bedside ultrasound) are read by the radiologist, see reports below, unless otherwise noted in MDM or here. CTA HEAD NECK W CONTRAST   Preliminary Result   Stenosis or dissection or compromise in the common carotid and internal   carotid arteries in the neck. Mild stenosis at the origin of the left vertebral artery. Rest of left   vertebral artery is normally patent. Right vertebral artery is of very small caliber. No evidence of compromise in the intracranial anterior circulation of   bilateral internal carotid arteries. No evidence of compromise in the vertebrobasilar arterial circulation. No definable focal acute process in brain. No definable dural sinus thrombosis. CT HEAD WO CONTRAST   Preliminary Result   No evidence of intracranial hemorrhage or any other definable acute   intracranial abnormality. There is no focal abnormality or acute process in brain. Mild-to-moderate cerebral atrophy and moderate chronic microvascular   ischemic/aging changes in the white matter of both cerebral hemispheres. XR CHEST PORTABLE   Final Result   Retrocardiac opacity which could reflect atelectasis or pneumonia. Follow-up   PA and lateral chest radiographs may be helpful. Borderline cardiomegaly. LABS: All lab results were reviewed by myself, and all abnormals are listed below.   Labs Reviewed   COMPREHENSIVE METABOLIC PANEL - Abnormal; Notable for the following components:       Result Value    Glucose 246 (*)     Chloride 97 (*)     All other components within normal limits   CBC WITH AUTO DIFFERENTIAL - Abnormal; Notable for the following components:    Lymphocytes 22 (*)     Monocytes 8 (*)     Eosinophils % 5 (*)     All other components within normal limits   TROPONIN   URINALYSIS     EMERGENCY DEPARTMENT COURSE:   Vitals:    Vitals:    01/19/23 2146 01/19/23 2200 01/19/23 2229 01/19/23 2330   BP: (!) 141/68 135/61 (!) 145/58 (!) 145/71   Pulse: 71 58 61 55   Resp: 14 14 14 14   Temp:       TempSrc:       SpO2: 94% 96% 98% 93%   Weight:       Height:           The patient was given the following medications while in the emergency department:  Orders Placed This Encounter   Medications    prochlorperazine (COMPAZINE) injection 10 mg    iopamidol (ISOVUE-370) 76 % injection 75 mL    sodium chloride flush 0.9 % injection 10 mL    0.9 % sodium chloride bolus    aspirin chewable tablet 81 mg     CONSULTS:  IP CONSULT TO VASCULAR SURGERY  IP CONSULT TO FAMILY MEDICINE    FINAL IMPRESSION      1. Vertigo          DISPOSITION/PLAN   DISPOSITION Admitted 01/19/2023 11:05:36 PM      PATIENT REFERRED TO:  No follow-up provider specified.   DISCHARGE MEDICATIONS:  New Prescriptions    No medications on file     Tiffanie Charlton MD  AttendingEmergency Physician                       Zia Blanchard MD  01/19/23 0854

## 2023-01-20 NOTE — PROGRESS NOTES
01/20/23 1256   Encounter Summary   Encounter Overview/Reason  Volunteer Encounter   Service Provided For: Patient   Referral/Consult From: Mary   Last Encounter  01/20/23  (V)   Complexity of Encounter Low   Assessment/Intervention/Outcome   Intervention Prayer (assurance of)/[place]  (Gave 2 rosaries)

## 2023-01-20 NOTE — ED NOTES
The following labs labeled with pt sticker and tubed to lab:     [] Blue     [x] Lavender   [] on ice  [x] Green/yellow  [] Green/black [] on ice  [] Yellow  [] Red  [] Pink      [] COVID-19 swab    [] Rapid  [] PCR  [] Flu swab  [] Peds Viral Panel     [] Urine Sample  [] Pelvic Cultures  [] Blood Cultures          Cooper Hussein RN  01/19/23 1951

## 2023-01-20 NOTE — PLAN OF CARE
Problem: Discharge Planning  Goal: Discharge to home or other facility with appropriate resources  Outcome: Progressing  Flowsheets  Taken 1/20/2023 0800  Discharge to home or other facility with appropriate resources:   Identify barriers to discharge with patient and caregiver   Arrange for needed discharge resources and transportation as appropriate   Arrange for interpreters to assist at discharge as needed   Identify discharge learning needs (meds, wound care, etc)   Refer to discharge planning if patient needs post-hospital services based on physician order or complex needs related to functional status, cognitive ability or social support system  Taken 1/20/2023 0750  Discharge to home or other facility with appropriate resources:   Identify barriers to discharge with patient and caregiver   Arrange for needed discharge resources and transportation as appropriate   Identify discharge learning needs (meds, wound care, etc)   Arrange for interpreters to assist at discharge as needed   Refer to discharge planning if patient needs post-hospital services based on physician order or complex needs related to functional status, cognitive ability or social support system     Problem: Chronic Conditions and Co-morbidities  Goal: Patient's chronic conditions and co-morbidity symptoms are monitored and maintained or improved  Outcome: Progressing  Flowsheets (Taken 1/20/2023 0750)  Care Plan - Patient's Chronic Conditions and Co-Morbidity Symptoms are Monitored and Maintained or Improved:   Monitor and assess patient's chronic conditions and comorbid symptoms for stability, deterioration, or improvement   Collaborate with multidisciplinary team to address chronic and comorbid conditions and prevent exacerbation or deterioration   Update acute care plan with appropriate goals if chronic or comorbid symptoms are exacerbated and prevent overall improvement and discharge     Problem: ABCDS Injury Assessment  Goal: Absence of physical injury  Outcome: Progressing     Problem: Skin/Tissue Integrity  Goal: Absence of new skin breakdown  Description: 1. Monitor for areas of redness and/or skin breakdown  2. Assess vascular access sites hourly  3. Every 4-6 hours minimum:  Change oxygen saturation probe site  4. Every 4-6 hours:  If on nasal continuous positive airway pressure, respiratory therapy assess nares and determine need for appliance change or resting period.   Outcome: Progressing     Problem: Safety - Adult  Goal: Free from fall injury  Outcome: Progressing

## 2023-01-20 NOTE — PROGRESS NOTES
Humberto Shipman is a 78 y.o.  /  male who presents with Dizziness (Near syncopal) and Headache   and is admitted to the hospital for the management of Vertigo. According to patient, he has been having a headache across his forehead every day for approximately 1 month. Reports that last night, the pain became severe while watching TV. Additionally, patient reports that he has been having a vertigo/room spinning sensation intermittently for the past couple of days. This is aggravated by standing, but also occurs while sitting and moving his head. States that when his daughter visited today he mentioned the headache and she made him come to the ED. Patient denies associated symptoms. Denies fever, chills, chest pain, cough, abdominal pain, nausea, vomiting, diarrhea, and urinary symptoms. There are no other aggravating or alleviating factors. Symptoms are reported as constant and moderate. On assessment, patient is awake, alert, and oriented to all spheres. No facial droop; tongue midline. Speech clear; denies visual disturbances. Extremities strong and equal bilaterally against resistance. No drifting of the extremities with extension. Able to lift and hold lower extremities off the bed. TARIK; nystagmus noted.       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

## 2023-01-20 NOTE — CARE COORDINATION
CASE MANAGEMENT NOTE:    Admission Date:  1/19/2023 Isaiah Tovar is a 78 y.o.  male    Admitted for : Vertigo [R42]    Met with:  Patient    PCP:  Dr Netta Oviedo:  [place]      Is patient alert and oriented at time of discussion:  Yes    Current Residence/ Living Arrangements:  independently at home             Current Services PTA:  No    Does patient go to outpatient dialysis: No  If yes, location and chair time: n/a  Who is their nephrologist? N/a    Is patient agreeable to VNS: No    Freedom of choice provided:  NA    List of [address] provided: No    VNS chosen:  No    DME:  none    Home Oxygen: No    Nebulizer: No    CPAP/BIPAP: No    Supplier: N/A    Potential Assistance Needed: No    SNF needed: No    Freedom of choice and list provided: NA    Pharmacy:  [facility]       Is patient currently receiving oral anticoagulation therapy? No    Is the Patient an [facility] with Readmission Risk Score greater than 14%? No  If yes, pt needs a follow up appointment made within 7 days. Family Members/Caregivers that are willing and able to care for patient at home:    Yes    If yes, list name here:  daughter Tip Day    Family/caregivers educated on resources available including DME and respite care: NA    Transportation Provider:  Family             Discharge Plan:  1/20/23 - [facility] medicare - Patient is from home alone, still drives and very independent. Bedroom upstairs 8 -14 steps upstairs, bathroom upstairs and downstairs. Denies need of VNS, Plan is to return home with no needs. A few family members live on the same street as him and has lots of help if he needs it. Will follow . //pf                Electronically signed by: Tonja Maddox RN on 1/20/2023 at 3:33 PM

## 2023-01-20 NOTE — H&P
History and Physical Service  [facility]    HISTORY AND PHYSICAL EXAMINATION            Date:   1/20/2023  Patient name:  Vidal Jensen  MRN:   [id number]  Account:  [de-identified]  YOB: 1940  PCP:    Iban Gonzalez DO  Code Status:    Full Code    Chief Complaint:     Chief Complaint   Patient presents with    Dizziness     Near syncopal    Headache         History Obtained From:     Patient, EMR, nursing staff    HPI     This patient is a 78 y.o.  / Mary Conover presents with    78 y.o.  /  male who presents with Dizziness (Near syncopal) and Headache   and is admitted to the hospital for the management of Vertigo. According to patient, he has been having a headache across his forehead every day for approximately 1 month. Reports that last night, the pain became severe while watching TV. Additionally, patient reports that he has been having a vertigo/room spinning sensation intermittently for the past couple of days. This is aggravated by standing, but also occurs while sitting and moving his head. States that when his daughter visited today he mentioned the headache and she made him come to the ED. Patient denies associated symptoms. Denies fever, chills, chest pain, cough, abdominal pain, nausea, vomiting, diarrhea, and urinary symptoms. There are no other aggravating or alleviating factors. Symptoms are reported as constant and moderate. Review of Systems:   Denies any shortness of breath or cough  Denies chest pain or palpitations  Denies abdominal pain, diarrhea vomiting  Denies any new numbness tremors or weakness. Positive for headache and dizziness    A 10 point review of systems was performed and and negative except as mentioned in HPI  Positive and Negative as described in HPI.       Past Medical History:     Past Medical History:   Diagnosis Date    Ascending aortic aneurysm 4/17/2013    3.6 cm, PT DENIES BPH (benign prostatic hyperplasia) 2010    no tx @ this time    Colon cancer ([facility] [address] 11/13    rectal cancer, COLOSTOMY HAD CHEMO    Diabetes mellitus ([facility] [address] 2000    GERD (gastroesophageal reflux disease)     High cholesterol 2003    Hypertension 1990    Psoriasis     Septicemia ([facility] [address]     AFTER COLON SURGERY    Umbilical hernia         Past Surgical History:     Past Surgical History:   Procedure Laterality Date    CARDIAC CATHETERIZATION  2006    mild disease    CATARACT REMOVAL Bilateral 2005    COLECTOMY  1/27/2016    Laparotomy/partial colectomy with relocation of colostomy/repair of peristomal hernia with mesh    COLONOSCOPY  10-28 14    lipoma, no biopsies, per colostomy    LEFT COLECTOMY  11/13    with colostomy D/T CA    OTHER SURGICAL HISTORY Left 10-18-14    Removal of  Lt. chemo-port    TUNNELED VENOUS PORT PLACEMENT          Medications Prior to Admission:     Prior to Admission medications    Medication Sig Start Date End Date Taking? Authorizing Provider   tamsulosin (FLOMAX) 0.4 MG capsule Take 0.4 mg by mouth daily   Yes Historical Provider, MD   lisinopril (PRINIVIL;ZESTRIL) 2.5 MG tablet Take 2.5 mg by mouth daily   Yes Historical Provider, MD   lansoprazole (PREVACID) 30 MG capsule Take 30 mg by mouth daily   Yes Historical Provider, MD   ferrous sulfate 325 (65 FE) MG tablet Take 325 mg by mouth daily (with breakfast)   Yes Historical Provider, MD   Ascorbic Acid (VITAMIN C) 500 MG tablet Take 500 mg by mouth daily   Yes Historical Provider, MD   vitamin B-1 (THIAMINE) 100 MG tablet Take 100 mg by mouth daily B complex   Yes Historical Provider, MD   finasteride (PROSCAR) 5 MG tablet Take 1 tablet by mouth daily. 11/15/13  Yes Historical Provider, MD   glimepiride (AMARYL) 2 MG tablet Take 4 mg by mouth every morning (before breakfast). Yes Historical Provider, MD   Multiple Vitamins-Minerals (CENTRUM SILVER) TABS Take 1 tablet by mouth daily.    Yes Historical Provider, MD   metformin (GLUCOPHAGE) 1000 MG tablet Take 1,000 mg by mouth 2 times daily (with meals). Yes Historical Provider, MD   isosorbide mononitrate (IMDUR) 60 MG CR tablet Take 60 mg by mouth daily. Yes Historical Provider, MD   metoprolol (TOPROL-XL) 50 MG XL tablet Take 25 mg by mouth daily. Yes Historical Provider, MD   simvastatin (ZOCOR) 80 MG tablet Take 80 mg by mouth nightly. Yes Historical Provider, MD        Allergies:     Patient has no known allergies. Social History:     Tobacco:    reports that he has never smoked. He has never used smokeless tobacco.  Alcohol:      reports no history of alcohol use. Drug Use:  reports no history of drug use. Family History:     Family History   Problem Relation Age of Onset    Diabetes Mother     Rheum Arthritis Mother     Stroke Father     Cancer Father         colon    Cancer Brother         throat    Cancer Sister         skin    Heart Failure Brother            Physical Exam:   BP (!) 127/59   Pulse 53   Temp 98.7 °F (37.1 °C) (Oral)   Resp 17   Ht 5' 6\" (1.676 m)   Wt 173 lb (78.5 kg)   SpO2 98%   BMI 27.92 kg/m²   Recent Labs     01/20/23  0753 01/20/23  1130   POCGLU 101 185*       General Appearance:  alert, well appearing, and in no acute distress  Mental status: oriented to person, place, and time with normal affect  Head:  normocephalic, atraumatic. Eye: no icterus, redness, pupils equal and reactive, extraocular eye movements intact, conjunctiva clear  Ear: normal external ear, no discharge, hearing intact  Nose:  no drainage noted  Mouth: mucous membranes moist  Neck: supple, no carotid bruits, thyroid not palpable  Lungs: Bilateral equal air entry, clear to ausculation, no wheezing, rales or rhonchi, normal effort  Cardiovascular: normal rate, regular rhythm, no murmur, gallop, rub.   Abdomen: Colostomy present, soft, nontender, nondistended, normal bowel sounds, no hepatomegaly or splenomegaly  Neurologic: There are no new focal motor or sensory deficits, normal muscle tone and bulk, no abnormal sensation, normal speech, cranial nerves II through XII grossly intact  Skin: No gross lesions, rashes, bruising or bleeding on exposed skin area  Extremities:  peripheral pulses palpable, no pedal edema or calf pain with palpation  Psych: normal affect     Investigations:      Laboratory Testing:  Recent Results (from the past 24 hour(s))   Comprehensive Metabolic Panel    Collection Time: 01/19/23  7:49 PM   Result Value Ref Range    Glucose 246 (H) 70 - 99 mg/dL    BUN 13 8 - 23 mg/dL    Creatinine 0.99 0.70 - 1.20 mg/dL    Est, Glom Filt Rate >60 >60 mL/min/1.73m2    Calcium 9.3 8.6 - 10.4 mg/dL    Sodium 137 135 - 144 mmol/L    Potassium 4.3 3.7 - 5.3 mmol/L    Chloride 97 (L) 98 - 107 mmol/L    CO2 30 20 - 31 mmol/L    Anion Gap 10 9 - 17 mmol/L    Alkaline Phosphatase 48 40 - 129 U/L    ALT 28 5 - 41 U/L    AST 12 <40 U/L    Total Bilirubin 0.4 0.3 - 1.2 mg/dL    Total Protein 6.9 6.4 - 8.3 g/dL    Albumin 4.1 3.5 - 5.2 g/dL   CBC with Auto Differential    Collection Time: 01/19/23  7:49 PM   Result Value Ref Range    WBC 6.2 3.5 - 11.0 k/uL    RBC 4.97 4.5 - 5.9 m/uL    Hemoglobin 14.2 13.5 - 17.5 g/dL    Hematocrit 43.4 41 - 53 %    MCV 87.2 80 - 100 fL    MCH 28.5 26 - 34 pg    MCHC 32.7 31 - 37 g/dL    RDW 14.1 11.5 - 14.9 %    Platelets 931 657 - 321 k/uL    MPV 8.3 6.0 - 12.0 fL    Seg Neutrophils 64 36 - 66 %    Lymphocytes 22 (L) 24 - 44 %    Monocytes 8 (H) 1 - 7 %    Eosinophils % 5 (H) 0 - 4 %    Basophils 1 0 - 2 %    Segs Absolute 4.00 1.3 - 9.1 k/uL    Absolute Lymph # 1.40 1.0 - 4.8 k/uL    Absolute Mono # 0.50 0.1 - 1.3 k/uL    Absolute Eos # 0.30 0.0 - 0.4 k/uL    Basophils Absolute 0.10 0.0 - 0.2 k/uL   Troponin    Collection Time: 01/19/23  7:49 PM   Result Value Ref Range    Troponin, High Sensitivity 17 0 - 22 ng/L   EKG 12 Lead    Collection Time: 01/19/23  7:52 PM   Result Value Ref Range    Ventricular Rate 78 BPM    Atrial Rate 78 BPM    P-R Interval 190 ms    QRS Duration 138 ms    Q-T Interval 426 ms    QTc Calculation (Bazett) 485 ms    P Axis 34 degrees    R Axis -38 degrees    T Axis 30 degrees   Urinalysis    Collection Time: 01/19/23 11:46 PM   Result Value Ref Range    Color, UA Yellow Yellow    Turbidity UA Clear Clear    Glucose, Ur NEGATIVE NEGATIVE    Bilirubin Urine NEGATIVE NEGATIVE    Ketones, Urine NEGATIVE NEGATIVE    Specific Gravity, UA 1.034 (H) 1.000 - 1.030    Urine Hgb NEGATIVE NEGATIVE    pH, UA 7.5 5.0 - 8.0    Protein, UA NEGATIVE NEGATIVE    Urobilinogen, Urine Normal Normal    Nitrite, Urine NEGATIVE NEGATIVE    Leukocyte Esterase, Urine NEGATIVE NEGATIVE    Urinalysis Comments       Microscopic exam not performed based on chemical results unless requested in original order.    POC Glucose Fingerstick    Collection Time: 01/20/23  7:53 AM   Result Value Ref Range    POC Glucose 101 75 - 110 mg/dL   POC Glucose Fingerstick    Collection Time: 01/20/23 11:30 AM   Result Value Ref Range    POC Glucose 185 (H) 75 - 110 mg/dL       Recent Labs     01/19/23 1949   HGB 14.2   HCT 43.4   WBC 6.2   MCV 87.2      K 4.3   CL 97*   CO2 30   BUN 13   CREATININE 0.99   GLUCOSE 246*   AST 12   ALT 28   LABALBU 4.1       Hematology:  Recent Labs     01/19/23 1949   WBC 6.2   RBC 4.97   HGB 14.2   HCT 43.4   MCV 87.2   MCH 28.5   MCHC 32.7   RDW 14.1      MPV 8.3     Chemistry:  Recent Labs     01/19/23 1949      K 4.3   CL 97*   CO2 30   GLUCOSE 246*   BUN 13   CREATININE 0.99   ANIONGAP 10   LABGLOM >60   CALCIUM 9.3     Recent Labs     01/19/23 1949 01/20/23  0753 01/20/23  1130   PROT 6.9  --   --    LABALBU 4.1  --   --    AST 12  --   --    ALT 28  --   --    ALKPHOS 48  --   --    BILITOT 0.4  --   --    POCGLU  --  101 185*       Imaging/Diagnostics:       CT HEAD WO CONTRAST    Result Date: 1/19/2023  EXAMINATION: CT OF THE HEAD WITHOUT CONTRAST  1/19/2023 8:27 pm TECHNIQUE: CT of the head was performed without the administration of intravenous contrast. Automated exposure control, iterative reconstruction, and/or weight based adjustment of the mA/kV was utilized to reduce the radiation dose to as low as reasonably achievable. COMPARISON: CT scan of brain without contrast on 12/06/2009. HISTORY: ORDERING SYSTEM PROVIDED HISTORY: one month of headache and vertigo TECHNOLOGIST PROVIDED HISTORY: one month of headache and vertigo Decision Support Exception - unselect if not a suspected or confirmed emergency medical condition->Emergency Medical Condition (MA) Reason for Exam: one month of headache and vertigo FINDINGS: BRAIN/VENTRICLES: There is no acute intracranial hemorrhage, mass effect or midline shift. No abnormal extra-axial fluid collection. The gray-white differentiation is maintained without evidence of an acute infarct. Evidence of mild-to-moderate generalized cortical atrophy changes in both cerebral hemispheres. Cerebral ventricles are mild-to-moderately enlarged, indicating mild to moderate central atrophy. Evidence of cavum septum pellucidum at midline. Evidence of moderate chronic microvascular ischemic/aging changes with generalized low attenuation in the periventricular and central white matter bilaterally. ORBITS: The visualized portion of the orbits demonstrate no acute abnormality. SINUSES: The visualized paranasal sinuses and mastoid air cells demonstrate no acute abnormality. SOFT TISSUES/SKULL:  No acute abnormality of the visualized skull or soft tissues. No evidence of intracranial hemorrhage or any other definable acute intracranial abnormality. There is no focal abnormality or acute process in brain. Mild-to-moderate cerebral atrophy and moderate chronic microvascular ischemic/aging changes in the white matter of both cerebral hemispheres. XR CHEST PORTABLE    Result Date: 1/19/2023  EXAMINATION: ONE XRAY VIEW OF THE CHEST 1/19/2023 5:40 pm COMPARISON: None.  HISTORY: ORDERING SYSTEM PROVIDED HISTORY: dizziness TECHNOLOGIST PROVIDED HISTORY: dizziness Reason for Exam: dizziness FINDINGS: There is mild retrocardiac opacity. The lungs and costophrenic angles are otherwise clear. There is borderline cardiomegaly. The mediastinal contour and pulmonary vessels appear otherwise normal.  The interstitium is unremarkable. A Port-A-Cath has been removed in the interval.     Retrocardiac opacity which could reflect atelectasis or pneumonia. Follow-up PA and lateral chest radiographs may be helpful. Borderline cardiomegaly. CTA HEAD NECK W CONTRAST    Result Date: 1/19/2023  EXAMINATION: CTA OF THE HEAD AND NECK WITH CONTRAST 1/19/2023 8:28 pm: TECHNIQUE: CTA of the head and neck was performed with the administration of intravenous contrast. Multiplanar reformatted images are provided for review. MIP images are provided for review. Stenosis of the internal carotid arteries measured using NASCET criteria. Automated exposure control, iterative reconstruction, and/or weight based adjustment of the mA/kV was utilized to reduce the radiation dose to as low as reasonably achievable. COMPARISON: CT scan of brain without contrast on 12/06/2009, and 01/19/2023. HISTORY: ORDERING SYSTEM PROVIDED HISTORY: one month of vertigo and headache TECHNOLOGIST PROVIDED HISTORY: one month of vertigo and headache Decision Support Exception - unselect if not a suspected or confirmed emergency medical condition->Emergency Medical Condition (MA) Reason for Exam: one month of vertigo and headache FINDINGS: CTA NECK: AORTIC ARCH/ARCH VESSELS: No dissection or arterial injury. No significant stenosis of the brachiocephalic or subclavian arteries. CAROTID ARTERIES: No dissection, arterial injury, or hemodynamically significant stenosis by NASCET criteria. VERTEBRAL ARTERIES: No dissection, arterial injury, or significant stenosis. . At the origin of the left vertebral artery there is mild stenosis.   Rest of left vertebral artery is normally patent. Left vertebral artery is the dominant vertebral artery. Right vertebral artery is of very small caliber. At the origin of right vertebral artery there are calcified plaques with probable mild stenosis. Rest of right vertebral artery is patent without any other stenosis or compromise. SOFT TISSUES: The lung apices are clear. No cervical or superior mediastinal lymphadenopathy. The larynx and pharynx are unremarkable. No acute abnormality of the salivary and thyroid glands. BONES: Evidence of moderate to marked multilevel degenerative disc disease in the midportion and lower portion of the cervical spine and moderate to marked hypertrophic facet osteoarthritis scattered throughout cervical spine. CTA HEAD: ANTERIOR CIRCULATION: No significant stenosis of the intracranial internal carotid, anterior cerebral, or middle cerebral arteries. No aneurysm. POSTERIOR CIRCULATION: No significant stenosis of the vertebral, basilar, or posterior cerebral arteries. No aneurysm. OTHER: No dural venous sinus thrombosis on this non-dedicated study. BRAIN: No mass effect or midline shift. No extra-axial fluid collection. The gray-white differentiation is maintained. . Mild to moderate cerebral cortical atrophy and central atrophy. Moderate chronic microvascular ischemic/aging changes in the periventricular and central white matter bilaterally. Otherwise there is no obvious focal abnormality or acute no evidence of process in visualized brain. Stenosis or dissection or compromise in the common carotid and internal carotid arteries in the neck. Mild stenosis at the origin of the left vertebral artery. Rest of left vertebral artery is normally patent. Right vertebral artery is of very small caliber. No evidence of compromise in the intracranial anterior circulation of bilateral internal carotid arteries. No evidence of compromise in the vertebrobasilar arterial circulation.  No definable focal acute process in brain. No definable dural sinus thrombosis. MRI BRAIN WO CONTRAST    Result Date: 1/20/2023  EXAMINATION: MRI OF THE BRAIN WITHOUT CONTRAST  1/20/2023 1:50 pm TECHNIQUE: Multiplanar multisequence MRI of the brain was performed without the administration of intravenous contrast. COMPARISON: CT angiogram brain 01/19/2023 HISTORY: ORDERING SYSTEM PROVIDED HISTORY: abnormal CTA results TECHNOLOGIST PROVIDED HISTORY: abnormal CTA results What is the sedation requirement?->None Decision Support Exception - unselect if not a suspected or confirmed emergency medical condition->Emergency Medical Condition (MA) Reason for Exam: abnormal CTA results Additional signs and symptoms: Dizziness; Headache FINDINGS: INTRACRANIAL STRUCTURES/VENTRICLES: There are no areas of restricted diffusion identified to suggest an acute infarct. There is no acute intracranial hemorrhage. No mass effect or midline shift is present. There are multiple foci of abnormal increased T2/FLAIR signal intensity within the periventricular and deep white matter of both hemispheres. There is no sellar or suprasellar mass present. There is no ventriculomegaly or abnormal extra-axial fluid collection present. The proximal portions of the [place] demonstrate normal flow voids. ORBITS: Limited evaluation of the orbits is unremarkable. SINUSES: The paranasal sinuses and mastoid air cells are clear. BONES/SOFT TISSUES: Bone marrow signal intensity is normal.     1. No acute infarct or acute intracranial process identified. 2. Mild chronic small vessel ischemic changes.         Current Facility-Administered Medications   Medication Dose Route Frequency Provider Last Rate Last Admin    sodium chloride flush 0.9 % injection 5-40 mL  5-40 mL IntraVENous 2 times per day ORI Kapadia - CNP   10 mL at 01/20/23 0947    sodium chloride flush 0.9 % injection 10 mL  10 mL IntraVENous PRN ORI Kapadia - CNP        0.9 % sodium chloride infusion   IntraVENous PRN [facility], APRN - CNP        enoxaparin (LOVENOX) injection 40 mg  40 mg SubCUTAneous Daily [facility], APRN - CNP   40 mg at 01/20/23 0946    ondansetron (ZOFRAN-ODT) disintegrating tablet 4 mg  4 mg Oral Q8H PRN [facility], APR[facility] - CNP        Or    [facility]ron [facility]) injection 4 mg  4 mg IntraVENous Q6H PRN [facility], APRN - CNP        magnesium hydroxide (MILK OF MAGNESIA) 400 MG/5ML suspension 30 mL  30 mL Oral Daily PRN [facility], APRN - CNP        acetaminophen (TYLENOL) tablet 650 mg  650 mg Oral Q6H PRN [facility], APRN - CNP        Or    acetaminophen (TYLENOL) suppository 650 mg  650 mg Rectal Q6H PRN [facility], APRN - CNP        aspirin EC tablet 81 mg  81 mg Oral Daily [facility], APRN - CNP   81 mg at 01/20/23 0946    finasteride (PROSCAR) tablet 5 mg  5 mg Oral Daily [facility], APRN - CNP   5 mg at 01/20/23 0946    isosorbide mononitrate (IMDUR) extended release tablet 60 mg  60 mg Oral Daily [facility], APRN - CNP   60 mg at 01/20/23 0946    pantoprazole (PROTONIX) tablet 20 mg  20 mg Oral QAM AC [facility], APRN - CNP   20 mg at 01/20/23 0946    lisinopril (PRINIVIL;ZESTRIL) tablet 2.5 mg  2.5 mg Oral Daily [facility], APRN - CNP   2.5 mg at 01/20/23 1028    metoprolol succinate (TOPROL XL) extended release tablet 25 mg  25 mg Oral Daily [facility], APRN - CNP   25 mg at 01/20/23 0946    atorvastatin (LIPITOR) tablet 40 mg  40 mg Oral Daily [facility], APRN - CNP   40 mg at 01/20/23 0946    tamsulosin (FLOMAX) capsule 0.4 mg  0.4 mg Oral Daily [facility], APRN - CNP   0.4 mg at 01/20/23 0946    glucose chewable tablet 16 g  4 tablet Oral PRN Maryjane Hernández, APRN - CNP        dextrose bolus 10% 125 mL  125 mL IntraVENous PRN Maryjane Hernández, APRN - CNP        Or    dextrose bolus 10% 250 mL  250 mL IntraVENous PRN Maryjane Hernández, APRN - CNP        glucagon (rDNA) injection 1 mg  1 mg IntraMUSCular PRN Maryjane Hernández, APRN - CNP        dextrose 10 % infusion   IntraVENous Continuous PRN Maria Elena Mention, APRN - CNP        insulin lispro (HUMALOG) injection vial 0-8 Units  0-8 Units SubCUTAneous TID WC Maria Elena Mention, APRN - CNP        insulin lispro (HUMALOG) injection vial 0-4 Units  0-4 Units SubCUTAneous Nightly Maria Elena Mention, APRN - CNP        melatonin tablet 3 mg  3 mg Oral Nightly PRN Maria Elena Mention, APRN - CNP   3 mg at 01/20/23 0104    meclizine (ANTIVERT) tablet 12.5 mg  12.5 mg Oral TID PRN Maria Elena Mention, APRN - CNP        sodium chloride flush 0.9 % injection 10 mL  10 mL IntraVENous PRN Andry Tavares MD   10 mL at 01/19/23 9908       Impressions :     1. Principal Problem:    Vertigo  Active Problems:    Carotid stenosis  Resolved Problems:    * No resolved hospital problems. *        2.  has a past medical history of Ascending aortic aneurysm (4/17/2013), BPH (benign prostatic hyperplasia) (2010), Colon cancer ([facility] [address] (11/13), Diabetes mellitus ([facility] [address] (2000), GERD (gastroesophageal reflux disease), High cholesterol (2003), Hypertension (1990), Psoriasis, Septicemia ([facility] [address] and Umbilical hernia.      Plans:     59-year-old male admitted with headaches, vertigo  History of colon cancer, BPH, hypertension  CTA shows stenosis at origin of left vertebral-vascular surgery consulted, no acute intervention awaiting results of carotid duplex  MRI brain nil acute  As needed meclizine in the meantime  PT/OT    Awaiting results of bilateral carotid duplex  Anticipate discharge tomorrow    DVT pplx-Heparin      Edgardo Arreguin MD  1/20/2023  4:13 PM

## 2023-01-20 NOTE — ED NOTES
The following labs labeled with pt sticker and tubed to lab:     [] Blue     [] Lavender   [] on ice  [] Green/yellow  [] Green/black [] on ice  [] Yellow  [] Red  [] Pink      [] COVID-19 swab    [] Rapid  [] PCR  [] Flu swab  [] Peds Viral Panel     [x] Urine Sample  [] Pelvic Cultures  [] Blood Cultures          Lenin Sebastian RN  01/19/23 7698

## 2023-01-20 NOTE — ACP (ADVANCE CARE PLANNING)
Advance Care Planning     Advance Care Planning Activator (Inpatient)  Conversation Note      Date of ACP Conversation: 1/20/2023     Conversation Conducted with: Patient with Decision Making Capacity   Healthcare Decision Maker: Next of Kin by law (only applies in absence of above) (name) Daughter Alec Mendez is MPOA for patient, Will have her bring in paperwork for chart. Latoya Jacobsen at bedside and also agreed this was correct, but need paperwork to be legal    ACP Activator: Ernesto Acosta RN        Health Care Decision Maker:     Current Designated Health Care Decision Maker:     Alec Mendez legal guardian MPOA [phone] and Ameena Horne [phone]- Uriel Fajardo [facility] is needed fro his medical records. She will get it. Today we documented Decision Maker(s) consistent with Legal Next of Kin hierarchy. Care Preferences    Ventilation: \"If you were in your present state of health and suddenly became very ill and were unable to breathe on your own, what would your preference be about the use of a ventilator (breathing machine) if it were available to you? \"      Would the patient desire the use of ventilator (breathing machine)?: yes    \"If your health worsens and it becomes clear that your chance of recovery is unlikely, what would your preference be about the use of a ventilator (breathing machine) if it were available to you? \"     Would the patient desire the use of ventilator (breathing machine)?: Yes      Resuscitation  \"CPR works best to restart the heart when there is a sudden event, like a heart attack, in someone who is otherwise healthy. Unfortunately, CPR does not typically restart the heart for people who have serious health conditions or who are very sick. \"    \"In the event your heart stopped as a result of an underlying serious health condition, would you want attempts to be made to restart your heart (answer \"yes\" for attempt to resuscitate) or would you prefer a natural death (answer \"no\" for do not attempt to resuscitate)? \" yes       [] Yes   [] No   Educated Patient / Rebbecca Branch regarding differences between Advance Directives and portable DNR orders.     Length of ACP Conversation in minutes:      Conversation Outcomes:  [] ACP discussion completed  [] Existing advance directive reviewed with patient; no changes to patient's previously recorded wishes  [] New Advance Directive completed  [] Portable Do Not Rescitate prepared for Provider review and signature  [] POLST/POST/MOLST/MOST prepared for Provider review and signature      Follow-up plan:    [] Schedule follow-up conversation to continue planning  [] Referred individual to Provider for additional questions/concerns   [] Advised patient/agent/surrogate to review completed ACP document and update if needed with changes in condition, patient preferences or care setting    [] This note routed to one or more involved healthcare providers

## 2023-01-20 NOTE — PLAN OF CARE
Please complete the MRI screening form online. MRI will be schedule once screening has been completed. Any questions, please call [phone]. Thank you!

## 2023-01-20 NOTE — ED NOTES
TRANSFER - OUT REPORT:    Verbal report given to Courtney Mtz RN on Lamont Murphy  being transferred to [facility] 2068 for routine progression of patient care       Report consisted of patient's Situation, Background, Assessment and   Recommendations(SBAR). Information from the following report(s) ED Encounter Summary, ED SBAR, [facility], Recent Results and Event Log was reviewed with the receiving nurse. [place] Assessment: Presents to emergency department  because of falls (Syncope, seizure, or loss of consciousness): Yes, Age > 79: Yes, Altered Mental Status, Intoxication with alcohol or substance confusion (Disorientation, impaired judgment, poor safety awaremess, or inability to follow instructions): No, Impaired Mobility: Ambulates or transfers with assistive devices or assistance; Unable to ambulate or transer.: No, Nursing Judgement: Yes  Lines:   Peripheral IV 01/19/23 Right Antecubital (Active)        Opportunity for questions and clarification was provided.       Patient transported with:  Vannessa Arechiga RN  01/20/23 6106

## 2023-01-21 VITALS
TEMPERATURE: 97.5 F | RESPIRATION RATE: 17 BRPM | BODY MASS INDEX: 27.8 KG/M2 | WEIGHT: 173 LBS | HEART RATE: 87 BPM | OXYGEN SATURATION: 96 % | DIASTOLIC BLOOD PRESSURE: 72 MMHG | SYSTOLIC BLOOD PRESSURE: 126 MMHG | HEIGHT: 66 IN

## 2023-01-21 LAB
ANION GAP SERPL CALCULATED.3IONS-SCNC: 9 MMOL/L (ref 9–17)
BUN BLDV-MCNC: 18 MG/DL (ref 8–23)
CALCIUM SERPL-MCNC: 9 MG/DL (ref 8.6–10.4)
CHLORIDE BLD-SCNC: 101 MMOL/L (ref 98–107)
CO2: 29 MMOL/L (ref 20–31)
CREAT SERPL-MCNC: 0.83 MG/DL (ref 0.7–1.2)
EKG ATRIAL RATE: 78 BPM
EKG P AXIS: 34 DEGREES
EKG P-R INTERVAL: 190 MS
EKG Q-T INTERVAL: 426 MS
EKG QRS DURATION: 138 MS
EKG QTC CALCULATION (BAZETT): 485 MS
EKG R AXIS: -38 DEGREES
EKG T AXIS: 30 DEGREES
EKG VENTRICULAR RATE: 78 BPM
GFR SERPL CREATININE-BSD FRML MDRD: >60 ML/MIN/1.73M2
GLUCOSE BLD-MCNC: 96 MG/DL (ref 70–99)
GLUCOSE BLD-MCNC: 98 MG/DL (ref 75–110)
HCT VFR BLD CALC: 40.9 % (ref 41–53)
HEMOGLOBIN: 13.5 G/DL (ref 13.5–17.5)
MCH RBC QN AUTO: 29 PG (ref 26–34)
MCHC RBC AUTO-ENTMCNC: 33.1 G/DL (ref 31–37)
MCV RBC AUTO: 87.5 FL (ref 80–100)
PDW BLD-RTO: 14.4 % (ref 11.5–14.9)
PLATELET # BLD: 189 K/UL (ref 150–450)
PMV BLD AUTO: 8.7 FL (ref 6–12)
POTASSIUM SERPL-SCNC: 3.6 MMOL/L (ref 3.7–5.3)
RBC # BLD: 4.68 M/UL (ref 4.5–5.9)
SODIUM BLD-SCNC: 139 MMOL/L (ref 135–144)
WBC # BLD: 5.8 K/UL (ref 3.5–11)

## 2023-01-21 PROCEDURE — 36415 COLL VENOUS BLD VENIPUNCTURE: CPT

## 2023-01-21 PROCEDURE — 82947 ASSAY GLUCOSE BLOOD QUANT: CPT

## 2023-01-21 PROCEDURE — 2580000003 HC RX 258: Performed by: NURSE PRACTITIONER

## 2023-01-21 PROCEDURE — 6360000002 HC RX W HCPCS: Performed by: NURSE PRACTITIONER

## 2023-01-21 PROCEDURE — 99239 HOSP IP/OBS DSCHRG MGMT >30: CPT | Performed by: INTERNAL MEDICINE

## 2023-01-21 PROCEDURE — 80048 BASIC METABOLIC PNL TOTAL CA: CPT

## 2023-01-21 PROCEDURE — 93010 ELECTROCARDIOGRAM REPORT: CPT | Performed by: INTERNAL MEDICINE

## 2023-01-21 PROCEDURE — 6370000000 HC RX 637 (ALT 250 FOR IP): Performed by: NURSE PRACTITIONER

## 2023-01-21 PROCEDURE — G0378 HOSPITAL OBSERVATION PER HR: HCPCS

## 2023-01-21 PROCEDURE — 96372 THER/PROPH/DIAG INJ SC/IM: CPT

## 2023-01-21 PROCEDURE — 85027 COMPLETE CBC AUTOMATED: CPT

## 2023-01-21 RX ORDER — ASPIRIN 81 MG/1
81 TABLET ORAL DAILY
Qty: 30 TABLET | Refills: 3 | Status: SHIPPED | OUTPATIENT
Start: 2023-01-22

## 2023-01-21 RX ORDER — ATORVASTATIN CALCIUM 40 MG/1
40 TABLET, FILM COATED ORAL DAILY
Qty: 30 TABLET | Refills: 3 | Status: SHIPPED | OUTPATIENT
Start: 2023-01-22

## 2023-01-21 RX ORDER — MECLIZINE HCL 12.5 MG/1
12.5 TABLET ORAL 3 TIMES DAILY PRN
Qty: 15 TABLET | Refills: 0 | Status: SHIPPED | OUTPATIENT
Start: 2023-01-21 | End: 2023-01-31

## 2023-01-21 RX ADMIN — PANTOPRAZOLE SODIUM 20 MG: 20 TABLET, DELAYED RELEASE ORAL at 05:46

## 2023-01-21 RX ADMIN — ISOSORBIDE MONONITRATE 60 MG: 60 TABLET, EXTENDED RELEASE ORAL at 08:58

## 2023-01-21 RX ADMIN — SODIUM CHLORIDE, PRESERVATIVE FREE 10 ML: 5 INJECTION INTRAVENOUS at 08:53

## 2023-01-21 RX ADMIN — METOPROLOL SUCCINATE 25 MG: 25 TABLET, EXTENDED RELEASE ORAL at 08:58

## 2023-01-21 RX ADMIN — LISINOPRIL 2.5 MG: 2.5 TABLET ORAL at 09:06

## 2023-01-21 RX ADMIN — ATORVASTATIN CALCIUM 40 MG: 40 TABLET, FILM COATED ORAL at 08:58

## 2023-01-21 RX ADMIN — FINASTERIDE 5 MG: 5 TABLET, FILM COATED ORAL at 08:58

## 2023-01-21 RX ADMIN — TAMSULOSIN HYDROCHLORIDE 0.4 MG: 0.4 CAPSULE ORAL at 08:58

## 2023-01-21 RX ADMIN — ENOXAPARIN SODIUM 40 MG: 100 INJECTION SUBCUTANEOUS at 08:58

## 2023-01-21 RX ADMIN — ASPIRIN 81 MG: 81 TABLET, COATED ORAL at 08:58

## 2023-01-21 NOTE — DISCHARGE SUMMARY
[facility], [facility] Internal Medicine    Discharge Summary     Patient ID: Irl Gitelman  :  1940   MRN: [id number]     ACCOUNT:  [de-identified]   Patient's PCP: Arthur Hyatt DO  Admit Date: 2023   Discharge Date: 2023  Length of Stay: 0  Code Status:  Full Code  Admitting Physician: Yen Abbott MD  Discharge Physician: Artur Borja MD     Active Discharge Diagnoses:     Primary Problem   [address] Problems    Diagnosis Date Noted    Carotid stenosis [I65.29] 2023     Priority: Medium    Vertigo [R42] 2023     Priority: Medium       Admission Condition:  fair     Discharged Condition: fair    Hospital Stay:     Hospital Course:  Irl Gitelman is a 78 y.o. male who was admitted for the management of Vertigo , presented to ER with Dizziness (Near syncopal) and Headache    15-year-old male admitted with headaches, vertigo  History of colon cancer, BPH, hypertension  CTA shows stenosis at origin of left vertebral-vascular surgery consulted, no acute intervention awaiting results of carotid duplex  MRI brain nil acute  As needed meclizine in the meantime led to relief of symptoms  Patient was advised to follow-up with PCP for carotid duplex results      Significant therapeutic interventions: As above    Significant Diagnostic Studies:   Labs / Micro:    Lab Results   Component Value Date    WBC 5.8 2023    HGB 13.5 2023    HCT 40.9 (L) 2023    MCV 87.5 2023     2023          Lab Results   Component Value Date/Time     2023 05:43 AM    K 3.6 2023 05:43 AM     2023 05:43 AM    CO2 29 2023 05:43 AM    BUN 18 2023 05:43 AM    CREATININE 0.83 2023 05:43 AM    GLUCOSE 96 2023 05:43 AM    GLUCOSE 88 2012 07:22 AM    CALCIUM 9.0 2023 05:43 AM          Radiology:    CT HEAD WO CONTRAST    Result Date: 1/19/2023  EXAMINATION: CT OF THE HEAD WITHOUT CONTRAST  1/19/2023 8:27 pm TECHNIQUE: CT of the head was performed without the administration of intravenous contrast. Automated exposure control, iterative reconstruction, and/or weight based adjustment of the mA/kV was utilized to reduce the radiation dose to as low as reasonably achievable. COMPARISON: CT scan of brain without contrast on 12/06/2009. HISTORY: ORDERING SYSTEM PROVIDED HISTORY: one month of headache and vertigo TECHNOLOGIST PROVIDED HISTORY: one month of headache and vertigo Decision Support Exception - unselect if not a suspected or confirmed emergency medical condition->Emergency Medical Condition (MA) Reason for Exam: one month of headache and vertigo FINDINGS: BRAIN/VENTRICLES: There is no acute intracranial hemorrhage, mass effect or midline shift. No abnormal extra-axial fluid collection. The gray-white differentiation is maintained without evidence of an acute infarct. Evidence of mild-to-moderate generalized cortical atrophy changes in both cerebral hemispheres. Cerebral ventricles are mild-to-moderately enlarged, indicating mild to moderate central atrophy. Evidence of cavum septum pellucidum at midline. Evidence of moderate chronic microvascular ischemic/aging changes with generalized low attenuation in the periventricular and central white matter bilaterally. ORBITS: The visualized portion of the orbits demonstrate no acute abnormality. SINUSES: The visualized paranasal sinuses and mastoid air cells demonstrate no acute abnormality. SOFT TISSUES/SKULL:  No acute abnormality of the visualized skull or soft tissues. No evidence of intracranial hemorrhage or any other definable acute intracranial abnormality. There is no focal abnormality or acute process in brain. Mild-to-moderate cerebral atrophy and moderate chronic microvascular ischemic/aging changes in the white matter of both cerebral hemispheres.      XR CHEST PORTABLE    Result Date: 1/19/2023  EXAMINATION: ONE XRAY VIEW OF THE CHEST 1/19/2023 5:40 pm COMPARISON: None. HISTORY: ORDERING SYSTEM PROVIDED HISTORY: dizziness TECHNOLOGIST PROVIDED HISTORY: dizziness Reason for Exam: dizziness FINDINGS: There is mild retrocardiac opacity. The lungs and costophrenic angles are otherwise clear. There is borderline cardiomegaly. The mediastinal contour and pulmonary vessels appear otherwise normal.  The interstitium is unremarkable. A Port-A-Cath has been removed in the interval.     Retrocardiac opacity which could reflect atelectasis or pneumonia. Follow-up PA and lateral chest radiographs may be helpful. Borderline cardiomegaly. CTA HEAD NECK W CONTRAST    Result Date: 1/19/2023  EXAMINATION: CTA OF THE HEAD AND NECK WITH CONTRAST 1/19/2023 8:28 pm: TECHNIQUE: CTA of the head and neck was performed with the administration of intravenous contrast. Multiplanar reformatted images are provided for review. MIP images are provided for review. Stenosis of the internal carotid arteries measured using NASCET criteria. Automated exposure control, iterative reconstruction, and/or weight based adjustment of the mA/kV was utilized to reduce the radiation dose to as low as reasonably achievable. COMPARISON: CT scan of brain without contrast on 12/06/2009, and 01/19/2023. HISTORY: ORDERING SYSTEM PROVIDED HISTORY: one month of vertigo and headache TECHNOLOGIST PROVIDED HISTORY: one month of vertigo and headache Decision Support Exception - unselect if not a suspected or confirmed emergency medical condition->Emergency Medical Condition (MA) Reason for Exam: one month of vertigo and headache FINDINGS: CTA NECK: AORTIC ARCH/ARCH VESSELS: No dissection or arterial injury. No significant stenosis of the brachiocephalic or subclavian arteries. CAROTID ARTERIES: No dissection, arterial injury, or hemodynamically significant stenosis by NASCET criteria.  VERTEBRAL ARTERIES: No dissection, arterial injury, or significant stenosis. . At the origin of the left vertebral artery there is mild stenosis. Rest of left vertebral artery is normally patent. Left vertebral artery is the dominant vertebral artery. Right vertebral artery is of very small caliber. At the origin of right vertebral artery there are calcified plaques with probable mild stenosis. Rest of right vertebral artery is patent without any other stenosis or compromise. SOFT TISSUES: The lung apices are clear. No cervical or superior mediastinal lymphadenopathy. The larynx and pharynx are unremarkable. No acute abnormality of the salivary and thyroid glands. BONES: Evidence of moderate to marked multilevel degenerative disc disease in the midportion and lower portion of the cervical spine and moderate to marked hypertrophic facet osteoarthritis scattered throughout cervical spine. CTA HEAD: ANTERIOR CIRCULATION: No significant stenosis of the intracranial internal carotid, anterior cerebral, or middle cerebral arteries. No aneurysm. POSTERIOR CIRCULATION: No significant stenosis of the vertebral, basilar, or posterior cerebral arteries. No aneurysm. OTHER: No dural venous sinus thrombosis on this non-dedicated study. BRAIN: No mass effect or midline shift. No extra-axial fluid collection. The gray-white differentiation is maintained. . Mild to moderate cerebral cortical atrophy and central atrophy. Moderate chronic microvascular ischemic/aging changes in the periventricular and central white matter bilaterally. Otherwise there is no obvious focal abnormality or acute no evidence of process in visualized brain. Stenosis or dissection or compromise in the common carotid and internal carotid arteries in the neck. Mild stenosis at the origin of the left vertebral artery. Rest of left vertebral artery is normally patent. Right vertebral artery is of very small caliber.  No evidence of compromise in the intracranial anterior circulation of bilateral internal carotid arteries. No evidence of compromise in the vertebrobasilar arterial circulation. No definable focal acute process in brain. No definable dural sinus thrombosis. MRI BRAIN WO CONTRAST    Result Date: 1/20/2023  EXAMINATION: MRI OF THE BRAIN WITHOUT CONTRAST  1/20/2023 1:50 pm TECHNIQUE: Multiplanar multisequence MRI of the brain was performed without the administration of intravenous contrast. COMPARISON: CT angiogram brain 01/19/2023 HISTORY: ORDERING SYSTEM PROVIDED HISTORY: abnormal CTA results TECHNOLOGIST PROVIDED HISTORY: abnormal CTA results What is the sedation requirement?->None Decision Support Exception - unselect if not a suspected or confirmed emergency medical condition->Emergency Medical Condition (MA) Reason for Exam: abnormal CTA results Additional signs and symptoms: Dizziness; Headache FINDINGS: INTRACRANIAL STRUCTURES/VENTRICLES: There are no areas of restricted diffusion identified to suggest an acute infarct. There is no acute intracranial hemorrhage. No mass effect or midline shift is present. There are multiple foci of abnormal increased T2/FLAIR signal intensity within the periventricular and deep white matter of both hemispheres. There is no sellar or suprasellar mass present. There is no ventriculomegaly or abnormal extra-axial fluid collection present. The proximal portions of the [place] demonstrate normal flow voids. ORBITS: Limited evaluation of the orbits is unremarkable. SINUSES: The paranasal sinuses and mastoid air cells are clear. BONES/SOFT TISSUES: Bone marrow signal intensity is normal.     1. No acute infarct or acute intracranial process identified. 2. Mild chronic small vessel ischemic changes.          Consultations:    Consults:     Final Specialist Recommendations/Findings:   IP CONSULT TO VASCULAR SURGERY  IP CONSULT TO FAMILY MEDICINE      The patient was seen and examined on day of discharge and this discharge summary is in conjunction with any daily progress note from day of discharge. Discharge plan:     Disposition: home      Instructions to Patient: Keep follow-up appointments      Requiring Further Evaluation/Follow Up POST HOSPITALIZATION/Incidental Findings:     Physician Follow Up:     No follow-up provider specified.      Activity: Resume as directed    Discharge Medications:      Medication List        START taking these medications      aspirin 81 MG EC tablet  Take 1 tablet by mouth daily  Start taking on: January 22, 2023     atorvastatin 40 MG tablet  Commonly known as: LIPITOR  Take 1 tablet by mouth daily  Start taking on: January 22, 2023  Replaces: simvastatin 80 MG tablet     meclizine 12.5 MG tablet  Commonly known as: ANTIVERT  Take 1 tablet by mouth 3 times daily as needed for Dizziness            CONTINUE taking these medications      Centrum Silver Tabs     ferrous sulfate 325 (65 Fe) MG tablet  Commonly known as: IRON 325     finasteride 5 MG tablet  Commonly known as: PROSCAR     Flomax 0.4 MG capsule  Generic drug: tamsulosin     glimepiride 2 MG tablet  Commonly known as: AMARYL     isosorbide mononitrate 60 MG extended release tablet  Commonly known as: IMDUR     lansoprazole 30 MG delayed release capsule  Commonly known as: PREVACID     lisinopril 2.5 MG tablet  Commonly known as: PRINIVIL;ZESTRIL     metFORMIN 1000 MG tablet  Commonly known as: GLUCOPHAGE     metoprolol succinate 50 MG extended release tablet  Commonly known as: TOPROL XL     vitamin B-1 100 MG tablet  Commonly known as: THIAMINE     vitamin C 500 MG tablet  Commonly known as: ASCORBIC ACID            STOP taking these medications      magnesium oxide 400 MG tablet  Commonly known as: MAG-OX     MAGnesium-Oxide 400 (241.3 Mg) MG Tabs tablet  Generic drug: magnesium oxide     simvastatin 80 MG tablet  Commonly known as: ZOCOR  Replaced by: atorvastatin 40 MG tablet               Where to Get Your Medications        These medications were sent to [id number] - [place], [address]  [address] [facility], [address] [place] [id number]      Phone: [phone]   aspirin 81 MG EC tablet  atorvastatin 40 MG tablet  meclizine 12.5 MG tablet         Time Spent on discharge is  35 mins in patient examination, evaluation, counseling as well as medication reconciliation, prescriptions for required medications, discharge plan and follow up. Electronically signed by   Hoda Dolan MD  1/21/2023  9:18 AM      Thank you Dr. Oswald Contreras DO for the opportunity to be involved in this patient's care.

## 2023-01-21 NOTE — CARE COORDINATION
ONGOING DISCHARGE PLAN:    Patient is alert and oriented x4. Spoke with patient regarding discharge plan and patient confirms that plan is still to DC to home w/ no needs. Denies VNS. Will continue to follow for additional discharge needs.     Electronically signed by Luis Michael RN on 1/21/2023 at 9:19 AM

## 2023-01-21 NOTE — DISCHARGE INSTR - COC
Continuity of Care Form    Patient Name: Diane Kimble   :  1940  MRN:  [id number]    Admit date:  2023  Discharge date:  ***    Code Status Order: Full Code   Advance Directives:     Admitting Physician:  Fani Villarreal MD  PCP: Jame Baldwin DO    Discharging Nurse: [facility] Unit/Room#: 2068/2068-01  Discharging Unit Phone Number: ***    Emergency Contact:   Extended Emergency Contact Information  Primary Emergency Contact: Concepcion Lafleur, [address]  Home Phone: [phone]  Relation: Legal Guardian  Secondary Emergency Contact: [place]ley[place]  Address:   Home Phone: [phone]  Relation: Child    Past Surgical History:  Past Surgical History:   Procedure Laterality Date    CARDIAC CATHETERIZATION  2006    mild disease    CATARACT REMOVAL Bilateral 2005    COLECTOMY  2016    Laparotomy/partial colectomy with relocation of colostomy/repair of peristomal hernia with mesh    COLONOSCOPY  10-28 14    lipoma, no biopsies, per colostomy    LEFT COLECTOMY      with colostomy D/T CA    OTHER SURGICAL HISTORY Left 10-18-14    Removal of  Lt. 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    Postoperative wound infection T81.49XA    Anemia D64.9    Hypotension I95.9    Weight loss R63.4    Urinary retention R33.9    CKD (chronic kidney disease), stage II N18.2    Vertigo R42    Carotid stenosis I65.29       Isolation/Infection:   Isolation            Contact Patient Infection Status       Infection Onset Added Last Indicated Last Indicated By Review Planned Expiration Resolved Resolved By    ESBL (Extended Spectrum Beta Lactamase)  01/28/16 01/28/16 Tee Foote RN        Klebsiella Pneumoniae - 11/27/2013 urine            Nurse Assessment:  Last Vital Signs: /72   Pulse 87   Temp 97.5 °F (36.4 °C)   Resp 17   Ht 5' 6\" (1.676 m)   Wt 173 lb (78.5 kg)   SpO2 96%   BMI 27.92 kg/m²     Last documented pain score (0-10 scale):    Last Weight:   Wt Readings from Last 1 Encounters:   01/19/23 173 lb (78.5 kg)     Mental Status:  {IP PT MENTAL STATUS:87729}    IV Access:  { RIGOBERTO IV ACCESS:578004980}    Nursing Mobility/ADLs:  Walking   {P DME VEGI:255592631}  Transfer  {P DME OUPX:947705275}  Bathing  {P DME BABK:633227400}  Dressing  {P DME IOXR:467245142}  Toileting  {P DME CXIM:526535311}  Feeding  {[facility] DME FCAP:116610560}  Med Admin  {P DME JYKT:631099019}  Med Delivery   { RIGOBERTO MED Delivery:972725070}    Wound Care Documentation and Therapy:        Elimination:  Continence: Bowel: {YES / BY:33822}  Bladder: {YES / LO:92011}  Urinary Catheter: {Urinary Catheter:993245191}   Colostomy/Ileostomy/Ileal Conduit: {YES / DZ:29311}  Colostomy LUQ-Stomal Appliance: Clean, dry & intact  Colostomy LUQ-Stoma  Assessment: Moist, Pink  Colostomy LUQ-Peristomal Assessment: Unable to assess, Other (Comment) (bag in place)  Colostomy LUQ-Stool Appearance: Soft  Colostomy LUQ-Stool Color: Brown    Date of Last BM: ***  No intake or output data in the 24 hours ending 01/21/23 0918  No intake/output data recorded.     Safety Concerns:     508 NotesFirst Safety Concerns:058806780}    Impairments/Disabilities:      508 NotesFirst Impairments/Disabilities:195460357}    Nutrition Therapy:  Current Nutrition Therapy:   508 NotesFirst Diet List:370635575}    Routes of Feeding: {CHP DME Other Feedings:362482072}  Liquids: {Slp liquid thickness:74747}  Daily Fluid Restriction: {P DME Yes amt example:195532939}  Last Modified Barium Swallow with Video (Video Swallowing Test): {Done Not Done GYPR:410332453}    Treatments at the Time of Hospital Discharge:   Respiratory Treatments: ***  Oxygen Therapy:  {Therapy; copd oxygen:65347}  Ventilator:    { CC Vent RGEX:622728421}    Rehab Therapies: {THERAPEUTIC INTERVENTION:4859501547}  Weight Bearing Status/Restrictions: {[facility] Weight Bearin}  Other Medical Equipment (for information only, NOT a DME order):  {EQUIPMENT:865714867}  Other Treatments: ***    Patient's personal belongings (please select all that are sent with patient):  {[facility] DME Belongings:292713099}    RN SIGNATURE:  {Esignature:216140731}    CASE MANAGEMENT/SOCIAL WORK SECTION    Inpatient Status Date: ***    Readmission Risk Assessment Score:  Readmission Risk              Risk of Unplanned Readmission:  0           Discharging to Facility/ Agency   Name:   Address:  Phone:  Fax:    Dialysis Facility (if applicable)   Name:  Address:  Dialysis Schedule:  Phone:  Fax:    / signature: {Esignature:598002390}    PHYSICIAN SECTION    Prognosis: {Prognosis:3428101243}    Condition at Discharge: [facility] Patient Condition:840607577}    Rehab Potential (if transferring to Rehab): {Prognosis:2441364027}    Recommended Labs or Other Treatments After Discharge: ***    Physician Certification: I certify the above information and transfer of Alexsandra Cole  is necessary for the continuing treatment of the diagnosis listed and that he requires {Admit to Appropriate Level of Care:37337} for {GREATER/LESS:120710435} 30 days.      Update Admission H&P: {P DME Changes in SAVAI:865481941}    PHYSICIAN SIGNATURE:  Electronically signed by John Foster MD on 23 at 9:18 AM EST

## 2023-01-21 NOTE — PLAN OF CARE
Problem: Discharge Planning  Goal: Discharge to home or other facility with appropriate resources  1/21/2023 1007 by Susan Miguel RN  Outcome: Adequate for Discharge  1/21/2023 1006 by Susan Miguel RN  Outcome: Progressing  Flowsheets (Taken 1/21/2023 0830)  Discharge to home or other facility with appropriate resources: Identify barriers to discharge with patient and caregiver     Problem: Chronic Conditions and Co-morbidities  Goal: Patient's chronic conditions and co-morbidity symptoms are monitored and maintained or improved  1/21/2023 1007 by Susan Miguel RN  Outcome: Adequate for Discharge  1/21/2023 1006 by Susan Miguel RN  Outcome: Progressing  Flowsheets (Taken 1/21/2023 0830)  Care Plan - Patient's Chronic Conditions and Co-Morbidity Symptoms are Monitored and Maintained or Improved: Monitor and assess patient's chronic conditions and comorbid symptoms for stability, deterioration, or improvement     Problem: ABCDS Injury Assessment  Goal: Absence of physical injury  1/21/2023 1007 by Susan Miguel RN  Outcome: Adequate for Discharge  1/21/2023 1006 by Susan Miguel RN  Outcome: Progressing     Problem: Skin/Tissue Integrity  Goal: Absence of new skin breakdown  Description: 1. Monitor for areas of redness and/or skin breakdown  2. Assess vascular access sites hourly  3. Every 4-6 hours minimum:  Change oxygen saturation probe site  4. Every 4-6 hours:  If on nasal continuous positive airway pressure, respiratory therapy assess nares and determine need for appliance change or resting period.   1/21/2023 1007 by Susan Miguel RN  Outcome: Adequate for Discharge  1/21/2023 1006 by Susan Miguel RN  Outcome: Progressing     Problem: Safety - Adult  Goal: Free from fall injury  1/21/2023 1007 by Susan Miguel RN  Outcome: Adequate for Discharge  1/21/2023 1006 by Susan Miguel RN  Outcome: Progressing

## 2023-01-21 NOTE — PLAN OF CARE
Problem: Discharge Planning  Goal: Discharge to home or other facility with appropriate resources  Outcome: Progressing  Flowsheets (Taken 1/21/2023 0830)  Discharge to home or other facility with appropriate resources: Identify barriers to discharge with patient and caregiver     Problem: Chronic Conditions and Co-morbidities  Goal: Patient's chronic conditions and co-morbidity symptoms are monitored and maintained or improved  Outcome: Progressing  Flowsheets (Taken 1/21/2023 0830)  Care Plan - Patient's Chronic Conditions and Co-Morbidity Symptoms are Monitored and Maintained or Improved: Monitor and assess patient's chronic conditions and comorbid symptoms for stability, deterioration, or improvement     Problem: ABCDS Injury Assessment  Goal: Absence of physical injury  Outcome: Progressing     Problem: Skin/Tissue Integrity  Goal: Absence of new skin breakdown  Description: 1. Monitor for areas of redness and/or skin breakdown  2. Assess vascular access sites hourly  3. Every 4-6 hours minimum:  Change oxygen saturation probe site  4. Every 4-6 hours:  If on nasal continuous positive airway pressure, respiratory therapy assess nares and determine need for appliance change or resting period.   Outcome: Progressing     Problem: Safety - Adult  Goal: Free from fall injury  Outcome: Progressing

## 2023-01-23 RX ORDER — ATORVASTATIN CALCIUM 40 MG/1
40 TABLET, FILM COATED ORAL DAILY
Qty: 90 TABLET | OUTPATIENT
Start: 2023-01-23

## 2025-07-09 ENCOUNTER — APPOINTMENT (OUTPATIENT)
Dept: GENERAL RADIOLOGY | Age: 85
DRG: 603 | End: 2025-07-09
Payer: OTHER GOVERNMENT

## 2025-07-09 ENCOUNTER — HOSPITAL ENCOUNTER (INPATIENT)
Age: 85
LOS: 1 days | Discharge: HOME | DRG: 603 | End: 2025-07-10
Attending: EMERGENCY MEDICINE | Admitting: INTERNAL MEDICINE
Payer: OTHER GOVERNMENT

## 2025-07-09 ENCOUNTER — APPOINTMENT (OUTPATIENT)
Dept: VASCULAR LAB | Age: 85
DRG: 603 | End: 2025-07-09
Attending: EMERGENCY MEDICINE
Payer: OTHER GOVERNMENT

## 2025-07-09 DIAGNOSIS — L03.116 CELLULITIS OF LEFT LEG: Primary | ICD-10-CM

## 2025-07-09 DIAGNOSIS — E03.9 HYPOTHYROIDISM, UNSPECIFIED TYPE: ICD-10-CM

## 2025-07-09 PROBLEM — L03.90 CELLULITIS: Status: ACTIVE | Noted: 2025-07-09

## 2025-07-09 LAB
ALBUMIN SERPL-MCNC: 3.7 G/DL (ref 3.5–5.2)
ALP SERPL-CCNC: 56 U/L (ref 40–129)
ALT SERPL-CCNC: 13 U/L (ref 10–50)
ANION GAP SERPL CALCULATED.3IONS-SCNC: 13 MMOL/L (ref 9–16)
AST SERPL-CCNC: 11 U/L (ref 10–50)
BASOPHILS # BLD: 0.04 K/UL (ref 0–0.2)
BASOPHILS NFR BLD: 1 % (ref 0–2)
BILIRUB SERPL-MCNC: 0.4 MG/DL (ref 0–1.2)
BUN SERPL-MCNC: 12 MG/DL (ref 8–23)
CALCIUM SERPL-MCNC: 9.2 MG/DL (ref 8.6–10.4)
CHLORIDE SERPL-SCNC: 102 MMOL/L (ref 98–107)
CO2 SERPL-SCNC: 24 MMOL/L (ref 20–31)
CREAT SERPL-MCNC: 1.2 MG/DL (ref 0.7–1.2)
CRP SERPL HS-MCNC: <3 MG/L (ref 0–5)
D DIMER PPP FEU-MCNC: 0.51 UG/ML FEU (ref 0–0.59)
ECHO BSA: 1.85 M2
EOSINOPHIL # BLD: 0.2 K/UL (ref 0–0.44)
EOSINOPHILS RELATIVE PERCENT: 3 % (ref 0–4)
ERYTHROCYTE [DISTWIDTH] IN BLOOD BY AUTOMATED COUNT: 14.2 % (ref 11.5–14.9)
ERYTHROCYTE [SEDIMENTATION RATE] IN BLOOD BY PHOTOMETRIC METHOD: 13 MM/HR (ref 0–20)
GFR, ESTIMATED: 59 ML/MIN/1.73M2
GLUCOSE BLD-MCNC: 112 MG/DL (ref 75–110)
GLUCOSE SERPL-MCNC: 223 MG/DL (ref 74–99)
HCT VFR BLD AUTO: 38.1 % (ref 41–53)
HGB BLD-MCNC: 12.3 G/DL (ref 13.5–17.5)
IMM GRANULOCYTES # BLD AUTO: <0.03 K/UL (ref 0–0.3)
IMM GRANULOCYTES NFR BLD: 0 %
INR PPP: 1.1
LYMPHOCYTES NFR BLD: 1.19 K/UL (ref 1.1–3.7)
LYMPHOCYTES RELATIVE PERCENT: 20 % (ref 24–44)
MCH RBC QN AUTO: 27 PG (ref 26–34)
MCHC RBC AUTO-ENTMCNC: 32.3 G/DL (ref 31–37)
MCV RBC AUTO: 83.7 FL (ref 80–100)
MONOCYTES NFR BLD: 0.55 K/UL (ref 0.1–1.2)
MONOCYTES NFR BLD: 9 % (ref 3–12)
NEUTROPHILS NFR BLD: 67 % (ref 36–66)
NEUTS SEG NFR BLD: 3.96 K/UL (ref 1.5–8.1)
NRBC BLD-RTO: 0 PER 100 WBC
PARTIAL THROMBOPLASTIN TIME: 27.1 SEC (ref 24–36)
PLATELET # BLD AUTO: 214 K/UL (ref 150–450)
PMV BLD AUTO: 9.5 FL (ref 8–13.5)
POTASSIUM SERPL-SCNC: 3.8 MMOL/L (ref 3.7–5.3)
PROT SERPL-MCNC: 6.5 G/DL (ref 6.6–8.7)
PROTHROMBIN TIME: 14.9 SEC (ref 11.8–14.6)
RBC # BLD AUTO: 4.55 M/UL (ref 4.21–5.77)
SODIUM SERPL-SCNC: 139 MMOL/L (ref 136–145)
URATE SERPL-MCNC: 5.9 MG/DL (ref 3.4–7)
WBC OTHER # BLD: 6 K/UL (ref 3.5–11)

## 2025-07-09 PROCEDURE — 85610 PROTHROMBIN TIME: CPT

## 2025-07-09 PROCEDURE — 85025 COMPLETE CBC W/AUTO DIFF WBC: CPT

## 2025-07-09 PROCEDURE — 80053 COMPREHEN METABOLIC PANEL: CPT

## 2025-07-09 PROCEDURE — 96374 THER/PROPH/DIAG INJ IV PUSH: CPT

## 2025-07-09 PROCEDURE — 93971 EXTREMITY STUDY: CPT

## 2025-07-09 PROCEDURE — 85379 FIBRIN DEGRADATION QUANT: CPT

## 2025-07-09 PROCEDURE — 36415 COLL VENOUS BLD VENIPUNCTURE: CPT

## 2025-07-09 PROCEDURE — 96375 TX/PRO/DX INJ NEW DRUG ADDON: CPT

## 2025-07-09 PROCEDURE — 6370000000 HC RX 637 (ALT 250 FOR IP)

## 2025-07-09 PROCEDURE — 2500000003 HC RX 250 WO HCPCS: Performed by: EMERGENCY MEDICINE

## 2025-07-09 PROCEDURE — 84550 ASSAY OF BLOOD/URIC ACID: CPT

## 2025-07-09 PROCEDURE — 6360000002 HC RX W HCPCS: Performed by: EMERGENCY MEDICINE

## 2025-07-09 PROCEDURE — 85730 THROMBOPLASTIN TIME PARTIAL: CPT

## 2025-07-09 PROCEDURE — 2580000003 HC RX 258: Performed by: EMERGENCY MEDICINE

## 2025-07-09 PROCEDURE — 1200000000 HC SEMI PRIVATE

## 2025-07-09 PROCEDURE — 86140 C-REACTIVE PROTEIN: CPT

## 2025-07-09 PROCEDURE — 73610 X-RAY EXAM OF ANKLE: CPT

## 2025-07-09 PROCEDURE — 99285 EMERGENCY DEPT VISIT HI MDM: CPT

## 2025-07-09 PROCEDURE — 6360000002 HC RX W HCPCS

## 2025-07-09 PROCEDURE — 85652 RBC SED RATE AUTOMATED: CPT

## 2025-07-09 PROCEDURE — 93971 EXTREMITY STUDY: CPT | Performed by: SURGERY

## 2025-07-09 PROCEDURE — 82947 ASSAY GLUCOSE BLOOD QUANT: CPT

## 2025-07-09 RX ORDER — ACETAMINOPHEN 325 MG/1
650 TABLET ORAL EVERY 6 HOURS PRN
Status: DISCONTINUED | OUTPATIENT
Start: 2025-07-09 | End: 2025-07-10 | Stop reason: HOSPADM

## 2025-07-09 RX ORDER — POTASSIUM CHLORIDE 7.45 MG/ML
10 INJECTION INTRAVENOUS PRN
Status: DISCONTINUED | OUTPATIENT
Start: 2025-07-09 | End: 2025-07-10 | Stop reason: HOSPADM

## 2025-07-09 RX ORDER — INSULIN LISPRO 100 [IU]/ML
0-8 INJECTION, SOLUTION INTRAVENOUS; SUBCUTANEOUS
Status: DISCONTINUED | OUTPATIENT
Start: 2025-07-09 | End: 2025-07-10 | Stop reason: HOSPADM

## 2025-07-09 RX ORDER — SODIUM CHLORIDE 0.9 % (FLUSH) 0.9 %
10 SYRINGE (ML) INJECTION PRN
Status: DISCONTINUED | OUTPATIENT
Start: 2025-07-09 | End: 2025-07-10 | Stop reason: HOSPADM

## 2025-07-09 RX ORDER — ONDANSETRON 2 MG/ML
4 INJECTION INTRAMUSCULAR; INTRAVENOUS EVERY 6 HOURS PRN
Status: DISCONTINUED | OUTPATIENT
Start: 2025-07-09 | End: 2025-07-10 | Stop reason: HOSPADM

## 2025-07-09 RX ORDER — ACETAMINOPHEN 650 MG/1
650 SUPPOSITORY RECTAL EVERY 6 HOURS PRN
Status: DISCONTINUED | OUTPATIENT
Start: 2025-07-09 | End: 2025-07-10 | Stop reason: HOSPADM

## 2025-07-09 RX ORDER — CALCIPOTRIENE 50 UG/G
CREAM TOPICAL DAILY
COMMUNITY

## 2025-07-09 RX ORDER — PANTOPRAZOLE SODIUM 40 MG/1
40 TABLET, DELAYED RELEASE ORAL DAILY
COMMUNITY

## 2025-07-09 RX ORDER — ENOXAPARIN SODIUM 100 MG/ML
40 INJECTION SUBCUTANEOUS DAILY
Status: DISCONTINUED | OUTPATIENT
Start: 2025-07-09 | End: 2025-07-10 | Stop reason: HOSPADM

## 2025-07-09 RX ORDER — LISINOPRIL 10 MG/1
10 TABLET ORAL DAILY
Status: DISCONTINUED | OUTPATIENT
Start: 2025-07-09 | End: 2025-07-10 | Stop reason: HOSPADM

## 2025-07-09 RX ORDER — POTASSIUM CHLORIDE 1500 MG/1
40 TABLET, EXTENDED RELEASE ORAL PRN
Status: DISCONTINUED | OUTPATIENT
Start: 2025-07-09 | End: 2025-07-10 | Stop reason: HOSPADM

## 2025-07-09 RX ORDER — DEXTROSE MONOHYDRATE 100 MG/ML
INJECTION, SOLUTION INTRAVENOUS CONTINUOUS PRN
Status: DISCONTINUED | OUTPATIENT
Start: 2025-07-09 | End: 2025-07-10 | Stop reason: HOSPADM

## 2025-07-09 RX ORDER — BISACODYL 10 MG
10 SUPPOSITORY, RECTAL RECTAL DAILY PRN
Status: DISCONTINUED | OUTPATIENT
Start: 2025-07-09 | End: 2025-07-10 | Stop reason: HOSPADM

## 2025-07-09 RX ORDER — ONDANSETRON 4 MG/1
4 TABLET, ORALLY DISINTEGRATING ORAL EVERY 8 HOURS PRN
Status: DISCONTINUED | OUTPATIENT
Start: 2025-07-09 | End: 2025-07-10 | Stop reason: HOSPADM

## 2025-07-09 RX ORDER — SODIUM CHLORIDE 9 MG/ML
INJECTION, SOLUTION INTRAVENOUS PRN
Status: DISCONTINUED | OUTPATIENT
Start: 2025-07-09 | End: 2025-07-10 | Stop reason: HOSPADM

## 2025-07-09 RX ORDER — TAMSULOSIN HYDROCHLORIDE 0.4 MG/1
0.4 CAPSULE ORAL DAILY
Status: DISCONTINUED | OUTPATIENT
Start: 2025-07-09 | End: 2025-07-10 | Stop reason: HOSPADM

## 2025-07-09 RX ORDER — PANTOPRAZOLE SODIUM 40 MG/1
40 TABLET, DELAYED RELEASE ORAL DAILY
Status: DISCONTINUED | OUTPATIENT
Start: 2025-07-09 | End: 2025-07-10 | Stop reason: HOSPADM

## 2025-07-09 RX ORDER — FINASTERIDE 5 MG/1
5 TABLET, FILM COATED ORAL DAILY
Status: DISCONTINUED | OUTPATIENT
Start: 2025-07-09 | End: 2025-07-10 | Stop reason: HOSPADM

## 2025-07-09 RX ORDER — LEVOTHYROXINE SODIUM 25 UG/1
25 TABLET ORAL DAILY
Status: DISCONTINUED | OUTPATIENT
Start: 2025-07-10 | End: 2025-07-10 | Stop reason: HOSPADM

## 2025-07-09 RX ORDER — ISOSORBIDE MONONITRATE 60 MG/1
60 TABLET, EXTENDED RELEASE ORAL DAILY
Status: DISCONTINUED | OUTPATIENT
Start: 2025-07-10 | End: 2025-07-10 | Stop reason: HOSPADM

## 2025-07-09 RX ORDER — POLYETHYLENE GLYCOL 3350 17 G/17G
17 POWDER, FOR SOLUTION ORAL DAILY PRN
Status: DISCONTINUED | OUTPATIENT
Start: 2025-07-09 | End: 2025-07-10 | Stop reason: HOSPADM

## 2025-07-09 RX ORDER — ATORVASTATIN CALCIUM 40 MG/1
40 TABLET, FILM COATED ORAL DAILY
Status: DISCONTINUED | OUTPATIENT
Start: 2025-07-09 | End: 2025-07-10 | Stop reason: HOSPADM

## 2025-07-09 RX ORDER — LEVOTHYROXINE SODIUM 25 UG/1
25 TABLET ORAL DAILY
Status: ON HOLD | COMMUNITY
End: 2025-07-10

## 2025-07-09 RX ORDER — MAGNESIUM SULFATE HEPTAHYDRATE 40 MG/ML
2000 INJECTION, SOLUTION INTRAVENOUS PRN
Status: DISCONTINUED | OUTPATIENT
Start: 2025-07-09 | End: 2025-07-10 | Stop reason: HOSPADM

## 2025-07-09 RX ADMIN — ENOXAPARIN SODIUM 40 MG: 100 INJECTION SUBCUTANEOUS at 19:08

## 2025-07-09 RX ADMIN — LISINOPRIL 10 MG: 10 TABLET ORAL at 19:09

## 2025-07-09 RX ADMIN — PANTOPRAZOLE SODIUM 40 MG: 40 TABLET, DELAYED RELEASE ORAL at 19:09

## 2025-07-09 RX ADMIN — WATER 1000 MG: 1 INJECTION INTRAMUSCULAR; INTRAVENOUS; SUBCUTANEOUS at 17:19

## 2025-07-09 RX ADMIN — VANCOMYCIN HYDROCHLORIDE 1750 MG: 1 INJECTION, POWDER, LYOPHILIZED, FOR SOLUTION INTRAVENOUS at 17:42

## 2025-07-09 ASSESSMENT — ENCOUNTER SYMPTOMS
SHORTNESS OF BREATH: 0
NAUSEA: 0
VOMITING: 0

## 2025-07-09 ASSESSMENT — PAIN - FUNCTIONAL ASSESSMENT: PAIN_FUNCTIONAL_ASSESSMENT: 0-10

## 2025-07-09 ASSESSMENT — PAIN DESCRIPTION - ORIENTATION: ORIENTATION: LEFT

## 2025-07-09 ASSESSMENT — PAIN DESCRIPTION - DESCRIPTORS: DESCRIPTORS: BURNING

## 2025-07-09 ASSESSMENT — PAIN DESCRIPTION - LOCATION: LOCATION: LEG

## 2025-07-09 ASSESSMENT — PAIN SCALES - GENERAL
PAINLEVEL_OUTOF10: 0
PAINLEVEL_OUTOF10: 5

## 2025-07-09 NOTE — PROGRESS NOTES
Pharmacy Medication History Note      List of current medications patient is taking is complete.    Source of information: Dispense Report, Care Everywhere, [facility] Reports, Patient, Patients Daughter, Medication Bottles Provided     Changes made to medication list:  Medications removed (include reason, ex. therapy complete or physician discontinued, noncompliance):  None    Medications flagged for provider review:  Vitamin C - pt no longer taking   Aspirin - pt no longer taking  Ferrous sulfate - pt no longer taking  Lansoprazole - pt no longer taking  Metoprolol - pt no longer taking  Multi-Vitamin - pt no longer taking  Vitamin B-1 - pt no longer taking    Medications added/doses adjusted:  Levothyroxine 25 mcg PO in the morning   Pantoprazole 40 mg PO daily   Calcipotriene Cream daily as needed   Lisinopril - dose changed to 10 mg as prescribed and as written on bottle     Other notes (ex. Recent course of antibiotics, Coumadin dosing):  OARRS report negative.   Pts daughter was able to show me all medication bottle pt takes / uses.   Pt is aware he takes 2 pills (levothyrozine & isosorbide) and metformin in the morning.     Current Home Medication List at Time of Admission:  Prior to Admission medications    Medication Sig   levothyroxine (SYNTHROID) 25 MCG tablet Take 1 tablet by mouth Daily   pantoprazole (PROTONIX) 40 MG tablet Take 1 tablet by mouth daily   calcipotriene (DOVONEX) 0.005 % cream Apply topically daily prn.    aspirin 81 MG EC tablet Take 1 tablet by mouth daily  Patient not taking: Reported on 7/9/2025   atorvastatin (LIPITOR) 40 MG tablet Take 1 tablet by mouth daily   tamsulosin (FLOMAX) 0.4 MG capsule Take 1 capsule by mouth daily   lisinopril (PRINIVIL;ZESTRIL) 2.5 MG tablet Take 4 tablets by mouth daily   lansoprazole (PREVACID) 30 MG capsule Take 30 mg by mouth daily  Patient not taking: Reported on 7/9/2025   ferrous sulfate 325 (65 FE) MG tablet Take 325 mg by mouth daily (with  breakfast)  Patient not taking: Reported on 7/9/2025   Ascorbic Acid (VITAMIN C) 500 MG tablet Take 500 mg by mouth daily  Patient not taking: Reported on 7/9/2025   vitamin B-1 (THIAMINE) 100 MG tablet Take 100 mg by mouth daily B complex  Patient not taking: Reported on 7/9/2025   finasteride (PROSCAR) 5 MG tablet Take 1 tablet by mouth daily   glimepiride (AMARYL) 2 MG tablet Take 2 tablets by mouth every morning (before breakfast)   Multiple Vitamins-Minerals (CENTRUM SILVER) TABS Take 1 tablet by mouth daily.  Patient not taking: Reported on 7/9/2025   metformin (GLUCOPHAGE) 1000 MG tablet Take 1 tablet by mouth 2 times daily (with meals)   isosorbide mononitrate (IMDUR) 60 MG CR tablet Take 1 tablet by mouth daily   metoprolol (TOPROL-XL) 50 MG XL tablet Take 25 mg by mouth daily.  Patient not taking: Reported on 7/9/2025     Please let me know if you have any questions about this encounter. Thank you!    Electronically signed by Angelika Morrow on 7/9/2025 at 5:52 PM

## 2025-07-09 NOTE — ED NOTES
Mode of arrival (squad #, walk in, police, etc) : walk-in        Chief complaint(s): wound check         Arrival Note (brief scenario, treatment PTA, etc).:  pt reports above wound on left lower leg with drainage x 1 week        C= \"Have you ever felt that you should Cut down on your drinking?\"  No  A= \"Have people Annoyed you by criticizing your drinking?\"  No  G= \"Have you ever felt bad or Guilty about your drinking?\"  No  E= \"Have you ever had a drink as an Eye-opener first thing in the morning to steady your nerves or to help a hangover?\"  No      Deferred []      Reason for deferring: N/A    *If yes to two or more: probable alcohol abuse.*    You can access the FollowMyHealth Patient Portal offered by [facility] by registering at the following website: [email or web address]. By joining [employer]’s FollowMyHealth portal, you will also be able to view your health information using other applications (apps) compatible with our system.

## 2025-07-09 NOTE — PROGRESS NOTES
[facility] Internal Medicine  Chabrel Petty MD; Mario Gutierrez MD; Karol Arcos MD;  Denise Verde MD; Karine Cristina MD  [facility] Internal Medicine   IN-PATIENT SERVICE  [facility]                 Date:   7/9/2025  Patientname:  Erasmo Goodman  Date of admission:  7/9/2025  3:27 PM  MRN:   [id number]  Account:  [id number]  YOB: 1940  PCP:    Jose Moser DO  Room:   2075/2075-01  Code Status:    Full Code      Chief Complaint:     Chief Complaint   Patient presents with    Wound Check     History of Present Illness:     Erasmo Goodman is a 84 y.o.  /  male who presents with Wound Check   and is admitted to the hospital for the management of Cellulitis.    Patient presented to the ED with complaints of a wound and foot swelling on the left lower extremity.  Patient states that he feels as though he has a psoriasis flareup on that area.  He stated that he noticed the irritation and swelling over the last week.  Patient stated that it became very itchy and he has been scratching at it causing it to bleed over the last day. Image of wound available below.  Bleeding is currently controlled at time of evaluation.  Patient denies any warmth or puslike drainage from the area.  Patient denies having any trauma or inciting event.  Patient has a significant medical history of psoriasis, hypertension, high cholesterol, GERD, and BPH.    Patient's blood chemistry is overall reassuring.  Glucose is elevated but patient states that he has been compliant with his oral antihyperglycemics.  Patient has no evidence of leukocytosis at this time.  An x-ray of the left lower extremity was completed that shows generalized soft tissue swelling involving the left ankle.  This is likely representative of cellulitis, with evidence of periosteal reaction.    Plan to bring patient to medical unit for administration of Rocephin and vancomycin.  Plan for MRI of lower  errors in transcription may have occurred.    [facility]  [address] [place] [address].   Phone [phone]

## 2025-07-09 NOTE — PROGRESS NOTES
[facility]   Pharmacy Pharmacokinetic Monitoring Service - Vancomycin     Erasmo Goodman is a 84 y.o. male starting on vancomycin therapy for cellulitis with rule-out osteomyelitis. Pharmacy consulted by Dr. Sams for monitoring and adjustment.    Target Concentration: Goal AUC/-600 mg*hr/L    Additional Antimicrobials: ceftriaxone    Pertinent Laboratory Values:   Wt Readings from Last 1 Encounters:   07/09/25 73.5 kg (162 lb)     Temp Readings from Last 1 Encounters:   07/09/25 97.5 °F (36.4 °C)     Estimated Creatinine Clearance: 40 mL/min (based on SCr of 1.2 mg/dL).  Recent Labs     07/09/25  1625   CREATININE 1.2   BUN 12   WBC 6.0     Procalcitonin: N/A     Pertinent Cultures:  Culture Date Source Results   None  None  None    MRSA Nasal Swab: N/A. Non-respiratory infection.    Plan:  Dosing recommendations based on Bayesian software  Start vancomycin 1750 mg x1 dose followed by 1000 mg every 24 hours   Anticipated AUC of 561 and trough concentration of 16.6 at steady state  Renal labs as indicated   Vancomycin concentration ordered for 7/11/25 @ 0600   Pharmacy will continue to monitor patient and adjust therapy as indicated    Thank you for the consult,  Elena Parsons RPH  7/9/2025 7:38 PM

## 2025-07-09 NOTE — ED NOTES
Report given to ELMER Cox from [facility].   Report method by phone.   The following was reviewed with receiving RN:   Current vital signs:  /66   Pulse 79   Temp 97.5 °F (36.4 °C) (Oral)   Resp 16   Ht 1.676 m (5' 6\")   Wt 73.5 kg (162 lb)   SpO2 92%   BMI 26.15 kg/m²                MEWS Score: 2     Any medication or safety alerts were reviewed. Any pending diagnostics and notifications were also reviewed, as well as any safety concerns or issues, abnormal labs, abnormal imaging, and abnormal assessment findings. Questions were answered.

## 2025-07-10 VITALS
HEART RATE: 50 BPM | BODY MASS INDEX: 26.03 KG/M2 | TEMPERATURE: 97.9 F | HEIGHT: 66 IN | OXYGEN SATURATION: 90 % | SYSTOLIC BLOOD PRESSURE: 107 MMHG | RESPIRATION RATE: 16 BRPM | DIASTOLIC BLOOD PRESSURE: 43 MMHG | WEIGHT: 162 LBS

## 2025-07-10 PROBLEM — L03.116 CELLULITIS OF LEFT LEG: Status: ACTIVE | Noted: 2025-07-10

## 2025-07-10 LAB
ANION GAP SERPL CALCULATED.3IONS-SCNC: 11 MMOL/L (ref 9–16)
BASOPHILS # BLD: 0.05 K/UL (ref 0–0.2)
BASOPHILS NFR BLD: 1 % (ref 0–2)
BUN SERPL-MCNC: 11 MG/DL (ref 8–23)
CALCIUM SERPL-MCNC: 9.2 MG/DL (ref 8.6–10.4)
CHLORIDE SERPL-SCNC: 103 MMOL/L (ref 98–107)
CO2 SERPL-SCNC: 27 MMOL/L (ref 20–31)
CREAT SERPL-MCNC: 0.8 MG/DL (ref 0.7–1.2)
EOSINOPHIL # BLD: 0.25 K/UL (ref 0–0.44)
EOSINOPHILS RELATIVE PERCENT: 5 % (ref 0–4)
ERYTHROCYTE [DISTWIDTH] IN BLOOD BY AUTOMATED COUNT: 14 % (ref 11.5–14.9)
GFR, ESTIMATED: 86 ML/MIN/1.73M2
GLUCOSE BLD-MCNC: 165 MG/DL (ref 75–110)
GLUCOSE BLD-MCNC: 176 MG/DL (ref 75–110)
GLUCOSE BLD-MCNC: 76 MG/DL (ref 75–110)
GLUCOSE SERPL-MCNC: 82 MG/DL (ref 74–99)
HCT VFR BLD AUTO: 35.9 % (ref 41–53)
HGB BLD-MCNC: 12 G/DL (ref 13.5–17.5)
IMM GRANULOCYTES # BLD AUTO: 0 K/UL (ref 0–0.3)
IMM GRANULOCYTES NFR BLD: 0 %
LYMPHOCYTES NFR BLD: 1.42 K/UL (ref 1.1–3.7)
LYMPHOCYTES RELATIVE PERCENT: 29 % (ref 24–44)
MCH RBC QN AUTO: 27.9 PG (ref 26–34)
MCHC RBC AUTO-ENTMCNC: 33.4 G/DL (ref 31–37)
MCV RBC AUTO: 83.5 FL (ref 80–100)
MONOCYTES NFR BLD: 0.54 K/UL (ref 0.1–1.2)
MONOCYTES NFR BLD: 11 % (ref 3–12)
MORPHOLOGY: NORMAL
NEUTROPHILS NFR BLD: 54 % (ref 36–66)
NEUTS SEG NFR BLD: 2.64 K/UL (ref 1.5–8.1)
NRBC BLD-RTO: 0 PER 100 WBC
PLATELET # BLD AUTO: 214 K/UL (ref 150–450)
PMV BLD AUTO: 10.3 FL (ref 8–13.5)
POTASSIUM SERPL-SCNC: 3.7 MMOL/L (ref 3.7–5.3)
RBC # BLD AUTO: 4.3 M/UL (ref 4.21–5.77)
SODIUM SERPL-SCNC: 141 MMOL/L (ref 136–145)
T4 FREE SERPL-MCNC: 1.2 NG/DL (ref 0.9–1.7)
TSH SERPL DL<=0.05 MIU/L-ACNC: 5.35 UIU/ML (ref 0.27–4.2)
WBC OTHER # BLD: 4.9 K/UL (ref 3.5–11)

## 2025-07-10 PROCEDURE — 2500000003 HC RX 250 WO HCPCS

## 2025-07-10 PROCEDURE — 6360000002 HC RX W HCPCS

## 2025-07-10 PROCEDURE — 85025 COMPLETE CBC W/AUTO DIFF WBC: CPT

## 2025-07-10 PROCEDURE — 99222 1ST HOSP IP/OBS MODERATE 55: CPT | Performed by: INTERNAL MEDICINE

## 2025-07-10 PROCEDURE — 84439 ASSAY OF FREE THYROXINE: CPT

## 2025-07-10 PROCEDURE — 36415 COLL VENOUS BLD VENIPUNCTURE: CPT

## 2025-07-10 PROCEDURE — 99223 1ST HOSP IP/OBS HIGH 75: CPT | Performed by: INTERNAL MEDICINE

## 2025-07-10 PROCEDURE — 6370000000 HC RX 637 (ALT 250 FOR IP)

## 2025-07-10 PROCEDURE — 80048 BASIC METABOLIC PNL TOTAL CA: CPT

## 2025-07-10 PROCEDURE — 84443 ASSAY THYROID STIM HORMONE: CPT

## 2025-07-10 PROCEDURE — 82947 ASSAY GLUCOSE BLOOD QUANT: CPT

## 2025-07-10 RX ORDER — CEFUROXIME AXETIL 500 MG/1
500 TABLET ORAL 2 TIMES DAILY
Qty: 14 TABLET | Refills: 0 | Status: SHIPPED | OUTPATIENT
Start: 2025-07-10 | End: 2025-07-17

## 2025-07-10 RX ORDER — LEVOTHYROXINE SODIUM 50 UG/1
50 TABLET ORAL DAILY
Qty: 30 TABLET | Refills: 1 | Status: SHIPPED | OUTPATIENT
Start: 2025-07-10

## 2025-07-10 RX ORDER — BENZOCAINE/MENTHOL 6 MG-10 MG
LOZENGE MUCOUS MEMBRANE
Qty: 30 G | Refills: 1 | Status: SHIPPED | OUTPATIENT
Start: 2025-07-10 | End: 2025-07-17

## 2025-07-10 RX ORDER — BENZOCAINE/MENTHOL 6 MG-10 MG
LOZENGE MUCOUS MEMBRANE 2 TIMES DAILY
Status: DISCONTINUED | OUTPATIENT
Start: 2025-07-10 | End: 2025-07-10 | Stop reason: HOSPADM

## 2025-07-10 RX ADMIN — LISINOPRIL 10 MG: 10 TABLET ORAL at 08:20

## 2025-07-10 RX ADMIN — ENOXAPARIN SODIUM 40 MG: 100 INJECTION SUBCUTANEOUS at 08:20

## 2025-07-10 RX ADMIN — TAMSULOSIN HYDROCHLORIDE 0.4 MG: 0.4 CAPSULE ORAL at 08:20

## 2025-07-10 RX ADMIN — SODIUM CHLORIDE, PRESERVATIVE FREE 10 ML: 5 INJECTION INTRAVENOUS at 08:20

## 2025-07-10 RX ADMIN — PANTOPRAZOLE SODIUM 40 MG: 40 TABLET, DELAYED RELEASE ORAL at 08:20

## 2025-07-10 RX ADMIN — LEVOTHYROXINE SODIUM 25 MCG: 0.03 TABLET ORAL at 06:42

## 2025-07-10 RX ADMIN — FINASTERIDE 5 MG: 5 TABLET, FILM COATED ORAL at 08:20

## 2025-07-10 RX ADMIN — ISOSORBIDE MONONITRATE 60 MG: 60 TABLET, EXTENDED RELEASE ORAL at 08:20

## 2025-07-10 RX ADMIN — ATORVASTATIN CALCIUM 40 MG: 40 TABLET, FILM COATED ORAL at 08:20

## 2025-07-10 ASSESSMENT — ENCOUNTER SYMPTOMS
CHOKING: 0
COLOR CHANGE: 1
NAUSEA: 0
EYE ITCHING: 0
EYE REDNESS: 0
DIARRHEA: 0
EYE PAIN: 0
COUGH: 0

## 2025-07-10 NOTE — PROGRESS NOTES
[facility]   Pharmacy Pharmacokinetic Monitoring Service - Vancomycin    Consulting Provider: Dr. Verde  Indication: SSTI, possible osteo  Target Concentration: Goal AUC/-600 mg*hr/L  Day of Therapy: 2  Additional Antimicrobials: Ceftriaxone    Pertinent Laboratory Values:   Wt Readings from Last 1 Encounters:   07/09/25 73.5 kg (162 lb)     Temp Readings from Last 1 Encounters:   07/10/25 97.9 °F (36.6 °C) (Oral)     Estimated Creatinine Clearance: 60 mL/min (based on SCr of 0.8 mg/dL).  Recent Labs     07/09/25  1625 07/10/25  0638   CREATININE 1.2 0.8   BUN 12 11   WBC 6.0 4.9         Pertinent Cultures:  MRSA Nasal Swab: N/A. Non-respiratory infection.    Recent vancomycin administrations                     vancomycin (VANCOCIN) 1,750 mg in sodium chloride 0.9 % 500 mL IVPB (mg) 1,750 mg New Bag 07/09/25 5442                    Assessment:  Note: Serum concentrations collected for AUC dosing may appear elevated if collected in close proximity to the dose administered, this is not necessarily an indication of toxicity    Plan:  Current dosing regimen is predicted to be sub-therapeutic  Increase dose to vanco 1250 mg iv q24h   Repeat vancomycin concentration ordered for 07/11 @ 0600   Pharmacy will continue to monitor patient and adjust therapy as indicated    Loading dose: N/A  Regimen: 1250 mg IV every 24 hours.  Start time: 17:42 on 07/10/2025  Exposure target: AUC24 (range) 400-600 mg/L.hr   AIQ71-25: 412 mg/L.hr  AUC24,ss: 465 mg/L.hr  Probability of AUC24 > 400: 72 %  Ctrough,ss: 11.5 mg/L  Probability of Ctrough,ss > 20: 7 %      Thank you for the consult,  Jacqueline Aguilar RPH  7/10/2025 3:19 PM

## 2025-07-10 NOTE — PROGRESS NOTES
Patient admitted to room 2075 from ED in [facility]. VSS. Bed locked in lowest position. Call light within reach.

## 2025-07-10 NOTE — PLAN OF CARE
Problem: Chronic Conditions and Co-morbidities  Goal: Patient's chronic conditions and co-morbidity symptoms are monitored and maintained or improved  7/10/2025 1610 by Kacie Rawls RN  Outcome: Completed  7/10/2025 1610 by Kacie Rawls RN  Outcome: Progressing     Problem: Discharge Planning  Goal: Discharge to home or other facility with appropriate resources  7/10/2025 1610 by Kacie Rawls RN  Outcome: Completed  7/10/2025 1610 by Kacie Rawls RN  Outcome: Progressing     Problem: Pain  Goal: Verbalizes/displays adequate comfort level or baseline comfort level  7/10/2025 1610 by Kacie Rawls RN  Outcome: Completed  7/10/2025 1610 by Kacie Rawls RN  Outcome: Progressing     Problem: Safety - Adult  Goal: Free from fall injury  7/10/2025 1610 by Kacie Rawls RN  Outcome: Completed  7/10/2025 1610 by Kacie Rawls RN  Outcome: Progressing     Problem: ABCDS Injury Assessment  Goal: Absence of physical injury  7/10/2025 1610 by Kacie Rawls RN  Outcome: Completed  7/10/2025 1610 by Kacie Rawls RN  Outcome: Progressing     Problem: Skin/Tissue Integrity  Goal: Skin integrity remains intact  Description: 1.  Monitor for areas of redness and/or skin breakdown  2.  Assess vascular access sites hourly  3.  Every 4-6 hours minimum:  Change oxygen saturation probe site  4.  Every 4-6 hours:  If on nasal continuous positive airway pressure, respiratory therapy assess nares and determine need for appliance change or resting period  7/10/2025 1610 by Kacie Rawls RN  Outcome: Completed  7/10/2025 1610 by Kacie Rawls RN  Outcome: Progressing

## 2025-07-10 NOTE — CONSULTS
[facility]  Initial Consult Note  Date: 7/10/2025    Hospital day :1     Chief complaint/reason for consultation:   Left lower extremity cellulitis concern for osteomyelitis    Assessment/Impression:   Cellulitis, left lower extremity  History of ESBL Klebsiella UTI, 2013    Plan/Recommendations   Continue IV Ceftriaxone 2G daily, okay to discharge on oral Ceftin 500mg BID x 7 days, electronic script sent   Discontinue IV Vancomycin  Monitor labs  Follow up with MARCE Coley in clinic next week  Discussed with Dr. Shahid  Infection Control Recommendations   [place] Precautions    Antimicrobial Stewardship Recommendations   Simplification of therapy  Targeted therapy      History of Present Illness:   Erasmo Goodman is a 84 y.o.-year-old male with a history of psoriasis, hypertension, GERD, BPH, and hyperlipidemia who was initially admitted on 7/9/2025 for left lower extremity foot swelling and wound with irritation and swelling over the last week.  Patient denies any puslike drainage from site. No systemic symptoms including nausea, fever, chills, fatigue, or diarrhea.   On initial presentation, labs were unremarkable.  White blood cell count 6.0, ESR 13, CRP less than 3.  Patient's vitals remained stable.  He was initiated on IV vancomycin and IV Rocephin and our team was consulted for further workup.    X-ray of left ankle showed generalized soft tissue swelling to the left ankle representing cellulitis osteomyelitis not excluded.   Podiatry team has been consulted and recommended patient to be seen by dermatology and rheumatology.     Interval changes  7/10/2025   Patient examined at bedside. He denies any issues, he is tolerating antibiotics well. No reactions. His left lower extremity has plaques with a erythremic border present. No drainage or warmth. Vitals and labs all unremarkable.     No data found.    I have personally reviewed the past medical history, past surgical history,  on file   Occupational History    Not on file   Tobacco Use    Smoking status: Never    Smokeless tobacco: Never   Substance and Sexual Activity    Alcohol use: No     Comment: HX of drinking    Drug use: No    Sexual activity: Not on file   Other Topics Concern    Not on file   Social History Narrative    Not on file     Social Drivers of Health     Financial Resource Strain: Not on file   Food Insecurity: No Food Insecurity (7/9/2025)    Hunger Vital Sign     Worried About Running Out of Food in the Last Year: Never true     Ran Out of Food in the Last Year: Never true   Transportation Needs: No Transportation Needs (7/9/2025)    PRAPARE - Transportation     Lack of Transportation (Medical): No     Lack of Transportation (Non-Medical): No   Physical Activity: Not on file   Stress: Not on file   Social Connections: Not on file   Intimate Partner Violence: Not on file   Housing Stability: Low Risk  (7/9/2025)    Housing Stability Vital Sign     Unable to Pay for Housing in the Last Year: No     Number of Times Moved in the Last Year: 1     Homeless in the Last Year: No     Family History:     Family History   Problem Relation Age of Onset    Diabetes Mother     Rheum Arthritis Mother     Stroke Father     Cancer Father         colon    Cancer Brother         throat    Cancer Sister         skin    Heart Failure Brother         Medical Decision Making:   I have independently reviewed/ordered the following labs:  CBC with Differential:   Recent Labs     07/09/25  1625 07/10/25  0638   WBC 6.0 4.9   HGB 12.3* 12.0*   HCT 38.1* 35.9*    214   LYMPHOPCT 20* 29   MONOPCT 9 11   EOSPCT 3 5*     BMP:   Recent Labs     07/09/25  1625 07/10/25  0638    141   K 3.8 3.7    103   CO2 24 27   BUN 12 11   CREATININE 1.2 0.8     Hepatic Function Panel:   Recent Labs     07/09/25  1625   BILITOT 0.4   ALKPHOS 56   ALT 13   AST 11       No results found for: \"PROCAL\"    Lab Results   Component Value Date/Time

## 2025-07-10 NOTE — PROGRESS NOTES
[facility]   OCCUPATIONAL THERAPY MISSED TREATMENT NOTE   INPATIENT   Date: 7/10/25  Patient Name: Erasmo Goodman       Room:   MRN: [id number]   Account #: [id number]    : 1940  (84 y.o.)  Gender: male                 REASON FOR MISSED TREATMENT:  7/10/25   -   OT being discontinued at this time. Patient functioning at Premorbid Level  No further needs  - pt independent with self care and mobility, no skilled OT required at this time as pt states he is at baseline functional level. D/C OT - please reorder should future OT needs arise.    1356        Electronically signed by MIMI Kilgore on 7/10/25 at 2:14 PM EDT

## 2025-07-10 NOTE — PLAN OF CARE
Problem: Chronic Conditions and Co-morbidities  Goal: Patient's chronic conditions and co-morbidity symptoms are monitored and maintained or improved  7/10/2025 0017 by Steve Bee RN  Outcome: Progressing  Flowsheets (Taken 7/9/2025 2015)  Care Plan - Patient's Chronic Conditions and Co-Morbidity Symptoms are Monitored and Maintained or Improved: Monitor and assess patient's chronic conditions and comorbid symptoms for stability, deterioration, or improvement  7/9/2025 1925 by Kacie Rawls RN  Outcome: Progressing     Problem: Discharge Planning  Goal: Discharge to home or other facility with appropriate resources  7/10/2025 0017 by Steve Bee RN  Outcome: Progressing  Flowsheets (Taken 7/9/2025 2015)  Discharge to home or other facility with appropriate resources: Identify barriers to discharge with patient and caregiver  7/9/2025 1925 by Kacie Rawls RN  Outcome: Progressing     Problem: Pain  Goal: Verbalizes/displays adequate comfort level or baseline comfort level  7/10/2025 0017 by Steve Bee RN  Outcome: Progressing  Flowsheets (Taken 7/9/2025 2100)  Verbalizes/displays adequate comfort level or baseline comfort level: Encourage patient to monitor pain and request assistance  7/9/2025 1925 by Kacie Rawls RN  Outcome: Progressing     Problem: Safety - Adult  Goal: Free from fall injury  7/10/2025 0017 by Steve Bee RN  Outcome: Progressing  Flowsheets (Taken 7/10/2025 0015)  Free From Fall Injury: Instruct family/caregiver on patient safety  7/9/2025 1925 by Kacie Rawls RN  Outcome: Progressing     Problem: ABCDS Injury Assessment  Goal: Absence of physical injury  7/10/2025 0017 by Steve Bee RN  Outcome: Progressing  Flowsheets (Taken 7/10/2025 0015)  Absence of Physical Injury: Implement safety measures based on patient assessment  7/9/2025 1925 by Kacie Rawls RN  Outcome: Progressing

## 2025-07-10 NOTE — PROGRESS NOTES
Physical Therapy        Physical Therapy Cancel Note      DATE: 7/10/2025    NAME: Erasmo Goodman  MRN: [id number]   : 1940      Patient not seen this date for Physical Therapy due to:    Patient independent with functional mobility. Will defer PT evaluation at this time. Please reorder PT if future needs arise.       Electronically signed by Janneth Webster PT on 7/10/2025 at 2:15 PM

## 2025-07-10 NOTE — H&P
tamsulosin (FLOMAX) 0.4 MG capsule Take 1 capsule by mouth daily   Yes ProviderLata MD   lisinopril (PRINIVIL;ZESTRIL) 10 MG tablet Take 1 tablet by mouth daily   Yes ProviderLata MD   finasteride (PROSCAR) 5 MG tablet Take 1 tablet by mouth daily 11/15/13  Yes ProviderLata MD   glimepiride (AMARYL) 4 MG tablet Take 1 tablet by mouth every morning (before breakfast)   Yes ProviderLata MD   metformin (GLUCOPHAGE) 1000 MG tablet Take 1 tablet by mouth 2 times daily (with meals)   Yes Provider, MD Lata   isosorbide mononitrate (IMDUR) 60 MG CR tablet Take 1 tablet by mouth daily   Yes Provider, MD Lata        Allergies:     Patient has no known allergies.    Social History:     Tobacco:    reports that he has never smoked. He has never used smokeless tobacco.  Alcohol:      reports no history of alcohol use.  Drug Use:  reports no history of drug use.    Family History:     Family History   Problem Relation Age of Onset   • Diabetes Mother    • Rheum Arthritis Mother    • Stroke Father    • Cancer Father         colon   • Cancer Brother         throat   • Cancer Sister         skin   • Heart Failure Brother        Review of Systems:     Positive and Negative as described in HPI.    CONSTITUTIONAL:  negative for fevers, chills, sweats, fatigue, weight loss  HEENT:  negative for vision, hearing changes, runny nose, throat pain  RESPIRATORY:  negative for shortness of breath, cough, congestion, wheezing  CARDIOVASCULAR:  negative for chest pain, palpitations  GASTROINTESTINAL:  negative for nausea, vomiting, diarrhea, constipation, change in bowel habits, abdominal pain   GENITOURINARY:  negative for difficulty of urination, burning with urination, frequency   INTEGUMENT:  negative for rash, skin lesions, easy bruising   HEMATOLOGIC/LYMPHATIC:  negative for swelling/edema   ALLERGIC/IMMUNOLOGIC:  negative for urticaria , itching  ENDOCRINE:  negative increase in  with Auto Differential    Collection Time: 07/09/25  4:25 PM   Result Value Ref Range    WBC 6.0 3.5 - 11.0 k/uL    RBC 4.55 4.21 - 5.77 m/uL    Hemoglobin 12.3 (L) 13.5 - 17.5 g/dL    Hematocrit 38.1 (L) 41.0 - 53.0 %    MCV 83.7 80.0 - 100.0 fL    MCH 27.0 26.0 - 34.0 pg    MCHC 32.3 31.0 - 37.0 g/dL    RDW 14.2 11.5 - 14.9 %    Platelets 214 150 - 450 k/uL    MPV 9.5 8.0 - 13.5 fL    NRBC Automated 0.0 0 per 100 WBC    Neutrophils % 67 (H) 36 - 66 %    Lymphocytes % 20 (L) 24 - 44 %    Monocytes % 9 3 - 12 %    Eosinophils % 3 0 - 4 %    Basophils % 1 0 - 2 %    Immature Granulocytes % 0 0 %    Neutrophils Absolute 3.96 1.50 - 8.10 k/uL    Lymphocytes Absolute 1.19 1.10 - 3.70 k/uL    Monocytes Absolute 0.55 0.10 - 1.20 k/uL    Eosinophils Absolute 0.20 0.00 - 0.44 k/uL    Basophils Absolute 0.04 0.00 - 0.20 k/uL    Immature Granulocytes Absolute <0.03 0.00 - 0.30 k/uL   Comprehensive Metabolic Panel    Collection Time: 07/09/25  4:25 PM   Result Value Ref Range    Sodium 139 136 - 145 mmol/L    Potassium 3.8 3.7 - 5.3 mmol/L    Chloride 102 98 - 107 mmol/L    CO2 24 20 - 31 mmol/L    Anion Gap 13 9 - 16 mmol/L    Glucose 223 (H) 74 - 99 mg/dL    BUN 12 8 - 23 mg/dL    Creatinine 1.2 0.7 - 1.2 mg/dL    Est, Glom Filt Rate 59 (L) >60 mL/min/1.73m2    Calcium 9.2 8.6 - 10.4 mg/dL    Total Protein 6.5 (L) 6.6 - 8.7 g/dL    Albumin 3.7 3.5 - 5.2 g/dL    Total Bilirubin 0.4 0.0 - 1.2 mg/dL    Alkaline Phosphatase 56 40 - 129 U/L    ALT 13 10 - 50 U/L    AST 11 10 - 50 U/L   Protime-INR    Collection Time: 07/09/25  4:25 PM   Result Value Ref Range    Protime 14.9 (H) 11.8 - 14.6 sec    INR 1.1    APTT    Collection Time: 07/09/25  4:25 PM   Result Value Ref Range    APTT 27.1 24.0 - 36.0 sec   D-Dimer, Quantitative    Collection Time: 07/09/25  4:25 PM   Result Value Ref Range    D-Dimer, Quant 0.51 0.00 - 0.59 ug/mL FEU   Uric Acid    Collection Time: 07/09/25  4:25 PM   Result Value Ref Range    Uric Acid 5.9 3.4 -

## 2025-07-10 NOTE — CONSULTS
Consult Note  Foot and Ankle Surgery    CC/Reason for consult:  Possible osteomyelitis of left ankle     Due to the complexity of the patient's chief complaint, the need for staged procedures, and their complicated past medical history, an extended hospital stay is anticipated for optimal management and recovery     HPI:    The patient is a 84 y.o. male seen at [facility] for possible osteomyelitis of the right ankle. They have had silver scaling on the anterior aspect of the right ankle for 6-7 days.  Patient endorses pruritus to the left lower extremity.  Patient underwent surgery for colectomy, colonoscopy, and cardiac catherization. They have PMH that is significant for psoriasis for the last 40 years, as well as hypertension, high cholesterol, GERD, and BPH. After using a cream that his daughter gave him, patient noticed redness and swelling on the anterior aspect of the right ankle and came to the ED for an evaluation. Upon ED evaluation, vital signs were normal, imaging was obtained which showed no pertinent findings, labs were obtained which indicated no signs of infection. They were admitted for possible osteomyelitis. Patient denies constitutional symptoms.   Foot and Ankle Surgery was consulted for further surgical evaluation.      PCP is Jose Moser,     ROS:   Review of Systems   Constitutional: Negative.    HENT:  Negative for dental problem, drooling and ear discharge.    Eyes:  Negative for pain, redness and itching.   Respiratory:  Negative for cough and choking.    Cardiovascular:  Negative for leg swelling.   Gastrointestinal:  Negative for diarrhea and nausea.   Musculoskeletal:  Positive for joint swelling. Negative for gait problem.   Skin:  Positive for color change and wound.   Neurological:  Negative for dizziness and numbness.     Past Medical History  Past Medical History:   Diagnosis Date    Ascending aortic aneurysm 4/17/2013    3.6 cm, PT DENIES    BPH (benign prostatic  Attending: Dr. Cynthia Mitchell, BALJITM   Foot and Ankle Surgery  7/10/2025 at 2:00 PM

## 2025-07-10 NOTE — CARE COORDINATION
Case Management Assessment  Initial Evaluation    Date/Time of Evaluation: 7/10/2025 10:17 AM  Assessment Completed by: Heaven Johansen RN    If patient is discharged prior to next notation, then this note serves as note for discharge by case management.    Patient Name: Erasmo Goodman                   YOB: 1940  Diagnosis: Cellulitis [L03.90]  Cellulitis of left leg [L03.116]                   Date / Time: 7/9/2025  3:27 PM    Patient Admission Status: Inpatient   Readmission Risk (Low < 19, Mod (19-27), High > 27): Readmission Risk Score: 11.5    Current PCP: Jose Moser, DO  PCP verified by CM? Yes    Chart Reviewed: Yes      History Provided by: Patient  Patient Orientation: Alert and Oriented    Patient Cognition: Alert    Hospitalization in the last 30 days (Readmission):  No    If yes, Readmission Assessment in CM Navigator will be completed.    Advance Directives:      Code Status: Full Code   Patient's Primary Decision Maker is: Legal Next of Kin    Primary Decision Maker: MicheleJoleen - Legal Guardian - [phone]    Secondary Decision Maker: JaquelineFrance - Child - [phone]    Discharge Planning:    Patient lives with: Alone Type of Home: House  Primary Care Giver: Self  Patient Support Systems include: Family Members   Current Financial resources: [place]  Current community resources: None  Current services prior to admission: Durable Medical Equipment            Current DME: Other (Comment) ([facility])            Type of Home Care services:  None    ADLS  Prior functional level: Independent in ADLs/IADLs  Current functional level: Independent in ADLs/IADLs    PT AM-PAC:   /24  OT AM-PAC:   /24    Family can provide assistance at DC: Yes  Would you like Case Management to discuss the discharge plan with any other family members/significant others, and if so, who? No  Plans to Return to Present Housing: Yes  Other Identified Issues/Barriers to RETURNING to current  housing: None  Potential Assistance needed at discharge: N/A            Potential DME:    Patient expects to discharge to: House  Plan for transportation at discharge: Family    Financial    Payor: [employer] MEDICARE / Plan: AETNA MEDICARE-ADVANTAGE PPO / Product Type: Medicare /     Does insurance require precert for SNF: Yes    Potential assistance Purchasing Medications: No  Meds-to-Beds request:        [id number] - [place], [place] - [address] - P [phone] - F [phone]  [address]  [place] [address]  Phone: [phone] Fax: [phone]      Notes:    Factors facilitating achievement of predicted outcomes: Family support, Cooperative, and Pleasant    Barriers to discharge: None    Additional Case Management Notes: 7/10/25 [employer] Medicare, [employer](Notified) Pt. Lives alone in 2 story home w/ [employer] 1st Floor. Daughter Ashley is Legal Guardian. DME- [facility], Independent/Drives. Denies VNS, ID- IV Rocephin/Vanco, PT/OT, HX of Colon CA 11 yrs ago. Will follow//KB    The Plan for Transition of Care is related to the following treatment goals of Cellulitis [L03.90]  Cellulitis of left leg [L03.116]    IF APPLICABLE: The Patient and/or patient representative Erasmo and his family were provided with a choice of provider and agrees with the discharge plan. Freedom of choice list with basic dialogue that supports the patient's individualized plan of care/goals and shares the quality data associated with the providers was provided to: Patient, Patient Representative   Patient Representative Name: Joleen Grace     The Patient and/or Patient Representative Agree with the Discharge Plan? Yes    Heaven Johansen RN  Case Management Department  Ph:  Fax:

## 2025-07-10 NOTE — ACP (ADVANCE CARE PLANNING)
Advance Care Planning     Advance Care Planning Activator (Inpatient)  Conversation Note      Date of ACP Conversation: 7/10/2025     Conversation Conducted with: Patient with Decision Making Capacity    ACP Activator: Heaven Johansen RN        Health Care Decision Maker:     Current Designated Health Care Decision Maker:     Primary Decision Maker: Joleen Bautista - Legal Guardian - [phone]    Secondary Decision Maker: France Parsons - Pooja - [phone]        Care Preferences    Ventilation:  \"If you were in your present state of health and suddenly became very ill and were unable to breathe on your own, what would your preference be about the use of a ventilator (breathing machine) if it were available to you?\"      Would the patient desire the use of ventilator (breathing machine)?: yes    \"If your health worsens and it becomes clear that your chance of recovery is unlikely, what would your preference be about the use of a ventilator (breathing machine) if it were available to you?\"     Would the patient desire the use of ventilator (breathing machine)?: No      Resuscitation  \"CPR works best to restart the heart when there is a sudden event, like a heart attack, in someone who is otherwise healthy. Unfortunately, CPR does not typically restart the heart for people who have serious health conditions or who are very sick.\"    \"In the event your heart stopped as a result of an underlying serious health condition, would you want attempts to be made to restart your heart (answer \"yes\" for attempt to resuscitate) or would you prefer a natural death (answer \"no\" for do not attempt to resuscitate)?\" yes       [] Yes   [] No   Educated Patient / Decision Maker regarding differences between Advance Directives and portable DNR orders.    Length of ACP Conversation in minutes:      Conversation Outcomes:  ACP discussion completed    Follow-up plan:    [] Schedule follow-up conversation to continue planning  [] Referred

## 2025-07-10 NOTE — DISCHARGE INSTR - COC
stage II N18.2    Vertigo R42    Carotid stenosis I65.29    Cellulitis L03.90    Cellulitis of left leg L03.116       Isolation/Infection:   Isolation            Contact          Patient Infection Status        Infection Onset Added Last Indicated Last Indicated By Review Planned Expiration    ESBL (Extended Spectrum Beta Lactamase)  01/28/16 01/28/16 Елена Shukla, RN      Klebsiella Pneumoniae - 11/27/2013 urine                    Nurse Assessment:  Last Vital Signs: BP (!) 107/43   Pulse 50   Temp 97.9 °F (36.6 °C) (Oral)   Resp 16   Ht 1.676 m (5' 6\")   Wt 73.5 kg (162 lb)   SpO2 90%   BMI 26.15 kg/m²     Last documented pain score (0-10 scale): Pain Level: 0  Last Weight:   Wt Readings from Last 1 Encounters:   07/09/25 73.5 kg (162 lb)     Mental Status:  {IP PT MENTAL STATUS:20030}    IV Access:  { RIGOBERTO IV ACCESS:174473145}    Nursing Mobility/ADLs:  Walking   {CHP DME ADLs:909806853}  Transfer  {CHP DME ADLs:833009613}  Bathing  {CHP DME ADLs:736689610}  Dressing  {CHP DME ADLs:244864969}  Toileting  {CHP DME ADLs:503480558}  Feeding  {P DME ADLs:175029683}  Med Admin  {P DME ADLs:737416979}  Med Delivery   { RIGOBERTO MED Delivery:084717466}    Wound Care Documentation and Therapy:  Wound 07/09/25 Coccyx stage 1 pressure injury (Active)   Wound Etiology Pressure Stage 1 07/10/25 0819   Dressing/Treatment Open to air 07/09/25 2015   Drainage Amount None (dry) 07/10/25 0819   Odor None 07/10/25 0819   Margot-wound Assessment Intact 07/10/25 0819   Margins Attached edges 07/09/25 1923   Number of days: 0        Elimination:  Continence:   Bowel: {YES / NO:19727}  Bladder: {YES / NO:19727}  Urinary Catheter: {Urinary Catheter:183738880}   Colostomy/Ileostomy/Ileal Conduit: {YES / NO:19727}       Date of Last BM: ***  No intake or output data in the 24 hours ending 07/10/25 1611  No intake/output data recorded.    Safety Concerns:     { RIGOBERTO Safety Concerns:033358603}    Impairments/Disabilities:     Electronically signed by Karine Cristina MD on 7/10/25 at 4:11 PM EDT

## 2025-07-14 ENCOUNTER — TELEPHONE (OUTPATIENT)
Dept: INFECTIOUS DISEASES | Age: 85
End: 2025-07-14

## 2025-07-14 NOTE — TELEPHONE ENCOUNTER
Spoke with pt he asked me to call his daughter Joleen Bautista at [phone].  LMOM for Joleen to call back and make the follow up appointment.

## 2025-07-14 NOTE — TELEPHONE ENCOUNTER
----- Message from Sheila EDWARDS sent at 7/14/2025  9:26 AM EDT -----  Joleen, please see message from MARCE Ann and call to schedule as requested. Thank you.  ----- Message -----  From: Vianca Ann APRN - CNP  Sent: 7/14/2025   9:24 AM EDT  To: [facility]  Staff; #    Hi, can we schedule this patient to be seen by me in 1-2 weeks or my next available please?

## 2025-07-14 NOTE — PROGRESS NOTES
Physician Progress Note      PATIENT:               YI CLARK  CSN #:                  [id number]  :                       1940  ADMIT DATE:       2025 3:27 PM  DISCH DATE:        7/10/2025 4:36 PM  RESPONDING  PROVIDER #:        Shubham Mitchell          QUERY TEXT:    Please document the relationship, if any, between the cellulitis and diabetes:    The clinical indicators include:  84 y.o present with management of Cellulitis    84 y.o  has a significant medical history of psoriasis, hypertension, DM    \"Wound and foot swelling on the left lower extremity, likely representative of   cellulitis, with evidence of periosteal reaction, Positive for redness and   swelling of left lower extremity\" (IM H&P Denise Verde MD)    \"Diabetes mellitus, Skin:  Positive for color change and wound, Possible   osteomyelitis of left ankle\" (Podiatry CN 7/10 Shubham Mitchell, DPNATHAN)      XR ankle left, ID consulted  Strict elevation of the limb to reduce swelling, please use blankets. Strict   heels off the bed at all times.  Glucose 223mg/dl on   POC glucose 112mg/dl 0n   176mg/dl,165mg/dl on 7/10    vancomycin (VANCOCIN) 1,250 mg in sodium chloride 0.9 % 250 mL IVPB,   ceftriaxone (ROCEPHIN) 2,000 mg in sodium chloride 0.9 % 50 mL IVPB, insulin   lispro (HUMALOG, ADMELOG) injection vial 0-8 Units    Pablo Correa [facility], CDS  Options provided:  -- Left lower extremity cellulitis is related to Diabetes  -- Left lower extremity cellulitis Unrelated to Diabetes  -- Other - I will add my own diagnosis  -- Disagree - Not applicable / Not valid  -- Disagree - Clinically unable to determine / Unknown  -- Refer to Clinical Documentation Reviewer    PROVIDER RESPONSE TEXT:    Left lower extremity cellulitis Unrelated to Diabetes.    Query created by: Pablo Serrano on 2025 6:29 AM      Electronically signed by:  Shubham Mitchell 2025 1:28 PM

## 2025-07-17 NOTE — DISCHARGE SUMMARY
IN-PATIENT SERVICE   [facility]    Discharge Summary     Patient ID: Erasmo Goodman  :  1940   MRN: [id number]     ACCOUNT:  [id number]   Patient's PCP: Jose Moser DO  Admit Date: 2025   Discharge Date: 2025    Length of Stay: 1  Code Status:  Prior  Admitting Physician: Denise Verde MD  Discharge Physician: Denise Verde MD     Active Discharge Diagnoses:     Primary Problem  Cellulitis      Hospital Problems  Active Hospital Problems    Diagnosis Date Noted    Cellulitis of left leg [L03.116] 07/10/2025    Cellulitis [L03.90] 2025       Admission Condition:  fair     Discharged Condition: fair    Hospital Stay:     Hospital Course:  Erasmo Goodman is a 84 y.o. male who was admitted for the management of Cellulitis , presented to ER with Wound Check    cellulitis left lower extremity, ?already improving, CRP is negative, no leukocytosis, started on Vanco and Rocephin, erythema has already improved, podiatry and ID consulted, x-ray of the ankle, showed generalized soft tissue swelling around involving the left ankle will present cellulitis osteomyelitis cannot be excluded, will defer to podiatry regarding MRI  2.  History of colon cancer status post ostomy  Hypertension, blood pressures currently borderline lower side, will cut down the dose of lisinopril and monitor  Hypothyroidism on levothyroxine check TSH  Hyperlipidemia on Lipitor      Patient was seen by podiatry no surgical intervention needed seen by ID, cleared for discharge on p.o. antibiotics discharged home in stable condition    Significant therapeutic interventions:     Significant Diagnostic Studies:   Labs / Micro:        Radiology:    Vascular duplex lower extremity venous left  Result Date: 2025    No evidence of deep vein or superficial vein thrombosis in the left lower extremity. Vessels demonstrate normal compressibility, color filling, and pulsatile and spontaneous flow.

## 2025-07-23 ENCOUNTER — TELEPHONE (OUTPATIENT)
Dept: INFECTIOUS DISEASES | Age: 85
End: 2025-07-23

## 2025-07-23 NOTE — TELEPHONE ENCOUNTER
----- Message from Sheila EDWARDS sent at 7/23/2025  8:40 AM EDT -----  Regarding: FW: Hospital follow up  Joleen, please see message from MARCE Ann and call to schedule as requested. Thank you.  ----- Message -----  From: Vianca Ann APRN - CNP  Sent: 7/23/2025   8:32 AM EDT  To: [facility]  Staff; #  Subject: Hospital follow up                               Can we please schedule this patient to be seen by me in clinic on my first available? Thank you